# Patient Record
Sex: MALE | Race: WHITE | NOT HISPANIC OR LATINO | Employment: OTHER | URBAN - METROPOLITAN AREA
[De-identification: names, ages, dates, MRNs, and addresses within clinical notes are randomized per-mention and may not be internally consistent; named-entity substitution may affect disease eponyms.]

---

## 2017-05-19 DIAGNOSIS — E11.9 DIABETES MELLITUS WITHOUT COMPLICATION: ICD-10-CM

## 2017-05-20 ENCOUNTER — OUTPATIENT CASE MANAGEMENT (OUTPATIENT)
Dept: ADMINISTRATIVE | Facility: OTHER | Age: 77
End: 2017-05-20

## 2017-05-20 NOTE — PROGRESS NOTES
For your Information:    Please note the following patient has been assigned to Emiliana Mcknight RN with [facility] for screening.    Reason: Diabetes Disease Management   hbA1C>10    Please contact [facility] at ext [phone] with questions.    Thank you,  Cira Medrano, SSC

## 2017-05-22 ENCOUNTER — OUTPATIENT CASE MANAGEMENT (OUTPATIENT)
Dept: ADMINISTRATIVE | Facility: OTHER | Age: 77
End: 2017-05-22

## 2017-05-22 RX ORDER — BENAZEPRIL HYDROCHLORIDE 20 MG/1
20 TABLET ORAL DAILY
Qty: 90 TABLET | Refills: 0 | Status: SHIPPED | OUTPATIENT
Start: 2017-05-22 | End: 2017-06-12

## 2017-05-22 RX ORDER — METFORMIN HYDROCHLORIDE 500 MG/1
500 TABLET, EXTENDED RELEASE ORAL DAILY
Qty: 90 TABLET | Refills: 0 | Status: SHIPPED | OUTPATIENT
Start: 2017-05-22 | End: 2017-06-12

## 2017-05-22 NOTE — LETTER
May 22, 2017    Gumaro Broussard  [address] [place] [address]             [facility]  [address] [place] [address] Dear Jimmy Broussard:    It was a please talking with you today. I have enclosed the education materials we discussed.       If you have any questions or concerns, please don't hesitate to call.  I can be reached at [phone].    Sincerely,        Emiliana Mcknight RN

## 2017-05-22 NOTE — PROGRESS NOTES
"Chart reviewed. Contacted pt to complete initial assessment. Pt states he has been staying with his sister in [place] most of the time. The address is [address] [place] [address]. Discussed pt's last A1c of 11.1 on 2/11/17. Pt states he is not familiar with the A1C. Pt states he does check his blood sugar. When he went to turn the meter on the get the last result, he says the batteries are dead. He will change the batteries today. Pt states he is "not good" with taking his medications. The only medication he has with him is Metformin. Pt would like to change his pharmacy to [facility] in [place]. Pt is not taking Aspirin. He states he will purchase from the pharmacy today. Will send message to PCP requesting refills. Pt states he was not aware of HRA appt on Friday. Information provided. He states he will be there. Will mail education materials and contact information. Will follow up in 1 week.   "

## 2017-05-22 NOTE — PATIENT INSTRUCTIONS
Using a Blood Sugar Log    You have diabetes. This means your body has trouble regulating a sugar called glucose. To help manage your diabetes, youll need to check your blood sugar level as directed by your healthcare provider. Keeping a log of your blood sugar levels will help you track your blood sugar readings. Its a simple and easy way to see how well you are controlling your diabetes.  Checking your blood sugar level  You can check your blood sugar level with a blood glucose meter. Youll first prick the side of your finger with a tiny lancet to draw a tiny drop of blood onto the test strip. Some glucose meters let you use another place on your body to test. But these other places should not be used in some cases as they may be inaccurate. Follow the instructions for your glucose meter. And talk with your healthcare provider before doing the test on other places.  The strip goes into the meter first, then a drop of blood is placed on the tip of the strip. The meter then shows a reading that tells you the level of your blood sugar. Your readings should be in your target range as often as possible. This means not too high or too low. Staying in this range helps lower your risk for complications. Your healthcare provider will help you figure out the target range that is best for you.  Tracking your readings  Every time you check your blood sugar, use your log to keep track of your readings. Your meter will also probably have a memory feature that your healthcare provider can check at your next visit. You may be advised by your healthcare provider to check your blood sugar in the morning, at bedtime, and before and after meals. Be sure to write down all of your numbers. Also use your log to record things that might have affected your blood sugar. Some examples include being sick, certain medicines, being physically active, feeling stressed, or skipping meals.   Lessons learned from your readings  Tracking your  blood sugar readings helps you see patterns. These patterns tell you how your actions affect your blood sugar. For instance, you may have higher numbers after eating certain foods or lower numbers after exercise. They just help you understand how to stay in your target range more often, so that your diabetes remains in good control.  Sharing your log with your healthcare team  Bring your blood sugar log and glucose meter with you to all of your healthcare appointments. This can help your healthcare team make changes to your treatment plan, if needed. This may involve making changes in what you eat, what medicines you take, or how much you exercise.  To learn more  The resources below can help you learn more:  · American Diabetes Association 798-920-8739 www.diabetes.org  · [employer] 385-215-0776 www.lighthouse.org  · National [employer] [place] 900-217-0264 www.nei.nih.gov  · Hormone [employer] 692-994-7966 www.hormone.org  Date Last Reviewed: 5/1/2016  © 7540-6315 [employer]. [address] [place] [address]. All rights reserved. This information is not intended as a substitute for professional medical care. Always follow your healthcare professional's instructions.        Hypoglycemia (Low Blood Sugar)     Fast-acting sugar includes a cup of nonfat milk.     Too little sugar (glucose) in your blood is called hypoglycemia or low blood sugar. Low blood sugar usually means anything lower than 70 mg/dL. Talk with your healthcare provider about your target range and what level is too low for you. Diabetes itself doesnt cause low blood sugar. But some of the treatments for diabetes, such as pills or insulin, may raise your risk for it. Low blood sugar may cause you to pass out or have a seizure. So always treat low blood sugar right away, but don't overeat.  Special note: Always carry a source of fast-acting sugar and a snack in case of hypoglycemia.   What you may notice  If you  have low blood sugar, you may have one or more of these symptoms:  · Shakiness or dizziness  · Cold, clammy skin or sweating  · Feelings of hunger  · Headache  · Nervousness  · A hard, fast heartbeat  · Weakness  · Confusion or irritability  · Blurred vision  · Having nightmares or waking up confused or sweating  · Numbness or tingling in the lips or tongue  What you should do  Here are tips to follow if you have hypoglycemia:   · First check your blood sugar. If it is too low (out of your target range), eat or drink 15 to 20 grams of fast-acting sugar. This may be 3 to 4 glucose tablets, 4 ounces (half a cup) of fruit juice or regular (nondiet) soda, 8 ounces (1 cup) of fat-free milk, or 1 tablespoon of honey. Dont take more than this, or your blood sugar may go too high.  · Wait 15 minutes. Then recheck your blood sugar if you can.  · If your blood sugar is still too low, repeat the steps above and check your blood sugar again. If your blood sugar still has not returned to your target range, contact your healthcare provider or seek emergency care.  · Once your blood sugar returns to target range, eat a snack or meal.  Preventing low blood sugar  Things you can do include the following:   · If your condition needs a strict treatment plan, eat your meals and snacks at the same times each day. Dont skip meals!  · If your treatment plan lets you change when you eat and what you eat, learn how to change the time and dose of your rapid-acting insulin to match this.   · Ask your healthcare provider if it is safe for you to drink alcohol. Never drink on an empty stomach.  · Take your medicine at the prescribed times.  · Always carry a source of fast-acting sugar and a snack when youre away from home.  Other things to do  Additional tips include the following:  · Carry a medical ID card, a compact USB drive, or wear a medical alert bracelet or necklace. It should say that you have diabetes. It should also say what to do  if you pass out or have a seizure.  · Make sure your family, friends, and coworkers know the signs of low blood sugar. Tell them what to do if your blood sugar falls very low and you cant treat yourself.  · Keep a glucagon emergency kit handy. Be sure your family, friends, and coworkers know how and when to use it. Check it regularly and replace the glucagon before it expires.  · Talk with your health care team about other things you can do to prevent low blood sugar.     If you have unexplained hypoglycemia or hypoglycemia several times, call your healthcare provider.   Date Last Reviewed: 5/1/2016 © 2000-2016 [employer]. [address] [place] [address], [place] [address]. All rights reserved. This information is not intended as a substitute for professional medical care. Always follow your healthcare professional's instructions.        Healthy Meals for Diabetes     A healthcare provider will help you develop a meal plan that fits your needs.   Ask your healthcare team to help you make a meal plan that fits your needs. Your meal plan tells you when to eat your meals and snacks, what kinds of foods to eat, and how much of each food to eat. You dont have to give up all the foods you like. But you do need to follow some guidelines.  Choose healthy carbohydrates  Starches, sugars, and fiber are all types of carbohydrates. Fiber can help lower your cholesterol and triglycerides. Fiber is also healthy for your heart. You should have 20 to 35 grams of total fiber each day. Fiber-rich foods include:  · Whole-grain breads and cereals  · Bulgur wheat  · Brown rice     · Whole-wheat pasta  · Fruits and vegetables  · Dry beans, and peas   Keep track of the amount of carbohydrates you eat. This can help you keep the right balance of physical activity and medicine. The amount of carbohydrates needed will vary for each person. It depends on many things such as your health, the medicines you take, and how active you are.  Your healthcare team will help you figure out the right amount of carbohydrates for you. You may start with around 45 to 60 grams of carbohydrates per meal, depending on your situation.   Here are some examples of foods containing about 15 grams of carbohydrates (1 serving of carbohydrates):  · 1/2 cup of canned or frozen fruit  · A small piece of fresh fruit (4 ounces)  · 1 slice of bread  · 1/2 cup of oatmeal  · 1/3 cup of rice  · 4 to 6 crackers  · 1/2 English muffin  · 1/2 cup of black beans  · 1/4 of a large baked potato (3 ounces)  · 2/3 cup of plain fat-free yogurt  · 1 cup of soup  · 1/2 cup of casserole  · 6 chicken nuggets  · 2-inch-square brownie or cake without frosting  · 2 small cookies  · 1/2 cup of ice cream or sherbet  Choose healthy protein foods  Eating protein that is low in fat can help you control your weight. It also helps keep your heart healthy. Low-fat protein foods include:  · Fish  · Plant proteins, such as dry beans and peas, nuts, and soy products like tofu and soymilk  · Lean meat with all visible fat removed  · Poultry with the skin removed  · Low-fat or nonfat milk, cheese, and yogurt  Limit unhealthy fats and sugar  Saturated and trans fats are unhealthy for your heart. They raise LDL (bad) cholesterol. Fat is also high in calories, so it can make you gain weight. To cut down on unhealthy fats and sugar, limit these foods:  · Butter or margarine  · Palm and palm kernel oils and coconut oil  · Cream  · Cheese  · [place]co  · Lunch meats     · Ice cream  · Sweet bakery goods such as pies, muffins, and donuts  · Jams and jellies  · Candy bars  · Regular sodas   How much to eat  The amount of food you eat affects your blood sugar. It also affects your weight. Your healthcare team will tell you how much of each type of food you should eat.  · Use measuring cups and spoons and a food scale to measure serving sizes.  · Learn what a correct serving size looks like on your plate. This will help  when you are away from home and cant measure your servings.  · Eat only the number of servings given on your meal plan for each food. Dont take seconds.  · Learn to read food labels. Be sure to look at serving size, total carbohydrates, fiber, calories, sugar, and saturated and trans fats. Look for healthier alternatives to foods that have added sugar.  · Plan ahead for parties so you can still have a good time without going overboard with unhealthy food choices. Set a good example yourself by bringing a healthy dish to pot lucks.   Choose healthy snacks  When it comes to snacks, we usually think about foods with added sugar and fats. But there are many other options for healthier snack choices. Here are a few snack ideas to choose from:  Snacks with less than 5 grams of carbohydrates  · 1 piece of string cheese  · 3 celery sticks plus 1 tablespoon of peanut butter  · 5 cherry tomatoes plus 1 tablespoon of ranch dressing  · 1 hard-boiled egg  · 1/4 cup of fresh blueberries  ·  5 baby carrots  · 1 cup of light popcorn  · 1/2 cup of sugar-free gelatin  · 15 almonds  Snacks with about 10 to 20 grams of carbohydrates  · 1/3 cup of hummus plus 1 cup of fresh cut nonstarchy vegetables (carrots, green peppers, broccoli, celery, or a combination)  · 1/2 cup of fresh or canned fruit plus 1/4 cup of cottage cheese  · 1/2 cup of tuna salad with 4 crackers  · 2 rice cakes and a tablespoon of peanut butter  · 1 small apple or orange  · 3 cups light popcorn  · 1/2 of a turkey sandwich (1 slice of whole-wheat bread, 2 ounces of turkey, and mustard)  Portion sizes are important to controlling your blood sugar and staying at a healthy weight. Stock up on healthy snack items so you always have them on hand.  When to eat  Your meal plan will likely include breakfast, lunch, dinner, and some snacks.  · Try to eat your meals and snacks at about the same times each day.  · Eat all your meals and snacks. Skipping a meal or snack can  make your blood sugar drop too low. It can also cause you to eat too much at the next meal or snack. Then your blood sugar could get too high.  Date Last Reviewed: 7/1/2016  © 9451-4149 [employer]. [address] [place] [address], [place] [address]. All rights reserved. This information is not intended as a substitute for professional medical care. Always follow your healthcare professional's instructions.        High Blood Sugar (Hyperglycemia)     When you have hyperglycemia, drink plenty of water or other sugar-free, caffeine-free liquids.   Too much glucose (sugar) in your blood is called hyperglycemia or high blood sugar. High blood sugar can lead to a dangerous condition called ketoacidosis. In severe cases, it can lead to dehydration and coma.  Possible causes of hyperglycemia  · Inadequate treatment plan for diabetes   · Being sick  · Being under stress  · Taking certain medicines, such as steroids  · Eating too much food, especially carbohydrates  · Being less active than usual  · Not taking enough diabetes medicine  Symptoms of hyperglycemia  Hyperglycemia may not cause symptoms. If you do have symptoms, they may include:  · Thirst  · Frequent need to urinate  · Feeling tired  · Nausea and vomiting  · Itchy, dry skin  · Blurry vision  · Fast breathing and breath that smells fruity   · Weakness  · Dizziness  · Wounds or skin infections that dont heal  · Unexplained weight loss if hyperglycemia lasts for more than a few days   What you should do  Make sure you do the following:  · Check your blood sugar.  · Drink plenty of sugar-free, caffeine-free liquids such as water. Dont drink fruit juice.  · Check your blood sugar again every 4 hours. If you take insulin or diabetes medicines, follow your sick-day plan for taking medicine. Call your healthcare provider if you are not able to eat.  · Check your blood or urine for ketones as directed.  · Call your healthcare provider if your blood sugar and  ketones do not return to your target range.  Preventing high blood sugar  To help keep your blood sugar from getting too high:  · Control stress.  · When you're ill, follow your sick-day plan.   · Follow your meal plan. Eat only the amount of food on your meal plan  · Follow your exercise plan.  · Take your insulin or diabetes medicines as directed by your healthcare team. Also test your blood sugar as directed. If the plan is not working for you, discuss it with your healthcare provider.  Other things to do  · Carry a medical ID card, a compact USB drive, or wear a medical alert bracelet or necklace. It should say that you have diabetes. It should also say what to do in case you pass out or go into a coma.  · Make sure family, friends, and coworkers know the signs of high blood sugar. Tell them what to do if your blood sugar gets very high and you cant help yourself.  · Talk to your healthcare team about other things you can do to prevent high blood sugar.   Special note: Drink plenty of sugar-free and caffeine-free liquids when you feel symptoms of hyperglycemia. Call your healthcare provider if you keep having episodes of hyperglycemia.   Date Last Reviewed: 5/1/2016  © 2075-1309 [employer] [address] [place] [address], [place] [address]. All rights reserved. This information is not intended as a substitute for professional medical care. Always follow your healthcare professional's instructions.

## 2017-05-23 ENCOUNTER — OUTPATIENT CASE MANAGEMENT (OUTPATIENT)
Dept: ADMINISTRATIVE | Facility: OTHER | Age: 77
End: 2017-05-23

## 2017-05-31 ENCOUNTER — OUTPATIENT CASE MANAGEMENT (OUTPATIENT)
Dept: ADMINISTRATIVE | Facility: OTHER | Age: 77
End: 2017-05-31

## 2017-06-06 ENCOUNTER — OUTPATIENT CASE MANAGEMENT (OUTPATIENT)
Dept: ADMINISTRATIVE | Facility: OTHER | Age: 77
End: 2017-06-06

## 2017-06-06 NOTE — PROGRESS NOTES
"Chart reviewed. Noted pt missed HRA appt. Telephonic follow up with pt. Pt states he forgot about the appt. And will call to reschedule. Reviewed upcoming appts with pt. Will send appt reminders via mail. Pt states he's been staying in [place]. He thinks he will probably choose a PCP on the [facility]. Pt will discuss with his sister. Pt states he changed the batteries in his glucometer but has not been monitoring. Pt states "I guess I should do that today". Pt agrees to monitor and log his blood sugars for 2 weeks. Pt states he is unsure if he received the education materials in the mail. He will check with his sister. Will follow up in 2 weeks.   "

## 2017-06-12 ENCOUNTER — OFFICE VISIT (OUTPATIENT)
Dept: OTOLARYNGOLOGY | Facility: CLINIC | Age: 77
End: 2017-06-12
Payer: MEDICARE

## 2017-06-12 ENCOUNTER — CLINICAL SUPPORT (OUTPATIENT)
Dept: AUDIOLOGY | Facility: CLINIC | Age: 77
End: 2017-06-12
Payer: MEDICARE

## 2017-06-12 VITALS
SYSTOLIC BLOOD PRESSURE: 139 MMHG | HEIGHT: 66 IN | DIASTOLIC BLOOD PRESSURE: 87 MMHG | BODY MASS INDEX: 29.73 KG/M2 | HEART RATE: 81 BPM | WEIGHT: 185 LBS

## 2017-06-12 DIAGNOSIS — H61.23 BILATERAL IMPACTED CERUMEN: Primary | ICD-10-CM

## 2017-06-12 DIAGNOSIS — H90.3 BILATERAL SENSORINEURAL HEARING LOSS: ICD-10-CM

## 2017-06-12 DIAGNOSIS — H90.3 BILATERAL HIGH FREQUENCY SENSORINEURAL HEARING LOSS: Primary | ICD-10-CM

## 2017-06-12 DIAGNOSIS — H91.93 HEARING DIFFICULTY, BILATERAL: Primary | ICD-10-CM

## 2017-06-12 PROCEDURE — 92567 TYMPANOMETRY: CPT | Mod: S$GLB,,, | Performed by: AUDIOLOGIST-HEARING AID FITTER

## 2017-06-12 PROCEDURE — 99999 PR PBB SHADOW E&M-EST. PATIENT-LVL III: CPT | Mod: PBBFAC,,, | Performed by: NURSE PRACTITIONER

## 2017-06-12 PROCEDURE — 92557 COMPREHENSIVE HEARING TEST: CPT | Mod: S$GLB,,, | Performed by: AUDIOLOGIST-HEARING AID FITTER

## 2017-06-12 PROCEDURE — G0268 REMOVAL OF IMPACTED WAX MD: HCPCS | Mod: S$GLB,,, | Performed by: NURSE PRACTITIONER

## 2017-06-12 PROCEDURE — 99203 OFFICE O/P NEW LOW 30 MIN: CPT | Mod: 25,S$GLB,, | Performed by: NURSE PRACTITIONER

## 2017-06-12 PROCEDURE — 1159F MED LIST DOCD IN RCRD: CPT | Mod: S$GLB,,, | Performed by: NURSE PRACTITIONER

## 2017-06-12 PROCEDURE — 1126F AMNT PAIN NOTED NONE PRSNT: CPT | Mod: S$GLB,,, | Performed by: NURSE PRACTITIONER

## 2017-06-12 RX ORDER — DONEPEZIL HYDROCHLORIDE 10 MG/1
10 TABLET, FILM COATED ORAL NIGHTLY
COMMUNITY
End: 2018-02-19

## 2017-06-12 RX ORDER — SITAGLIPTIN AND METFORMIN HYDROCHLORIDE 1000; 50 MG/1; MG/1
TABLET, FILM COATED, EXTENDED RELEASE ORAL
COMMUNITY
Start: 2017-06-07

## 2017-06-12 RX ORDER — LISINOPRIL 2.5 MG/1
TABLET ORAL
COMMUNITY
Start: 2017-06-06

## 2017-06-12 NOTE — PROGRESS NOTES
Gumaro Broussard was seen 06/12/2017 for an audiological evaluation. Patient complains of hearing loss. Pt reports decreased hearing AS. Pt had significant impacted ear wax AS that was removed by Coty Hodges, ENT NP. Pt and his son report no communication difficulties with regard to his hearing loss. He feels that he hears fine.     Results reveal a mild-to-severe sensorineural hearing loss for the right ear, and  mild-to-profound sensorineural hearing loss for the left ear.    Speech Reception Thresholds were  30 dBHL for the right ear and 30 dBHL for the left ear.    Word recognition scores were good for the right ear and good for the left ear.   Tympanograms were Type A for the right ear and Type A for the left ear.    Audiogram results were reviewed in detail with patient and all questions were answered. Results will be reviewed by ENT at the completion of this note. Recommend binaural amplification pending medical clearance, annual hearing tests to monitor hearing loss and hearing protection in loud noise. Pt said he will take notice of any problems hearing and call the clinic if he has any questions.

## 2017-06-12 NOTE — PROGRESS NOTES
Subjective:       Patient ID: Gumaro Broussard is a 77 y.o. male.    Chief Complaint: Cerumen Impaction    HPI   Patient last had his ears cleaned out in January by Dr. Anderson. He is here for audiogram; however audiologist was unable to perform any audiometric testing due to bilateral cerumen impactions today. He did not suspect cerumen impactions but rather decline in hearing. He denies otalgia, otorrhea, or other ENT symptom or concern at this time.     Review of Systems   Constitutional: Negative.    HENT: Positive for hearing loss.    Eyes: Negative.    Respiratory: Negative.    Cardiovascular: Negative.    Gastrointestinal: Negative.    Musculoskeletal: Negative.    Skin: Negative.    Neurological: Negative.    Hematological: Negative.    Psychiatric/Behavioral: Negative.        Objective:      Physical Exam   Constitutional: He is oriented to person, place, and time. Vital signs are normal. He appears well-developed and well-nourished. He is cooperative. He does not appear ill. No distress.   HENT:   Head: Normocephalic and atraumatic.   Right Ear: Hearing, tympanic membrane, external ear and ear canal normal. Tympanic membrane is not erythematous. No middle ear effusion.   Left Ear: Hearing, tympanic membrane, external ear and ear canal normal. Tympanic membrane is not erythematous.  No middle ear effusion.   Nose: Nose normal. No mucosal edema or rhinorrhea. Right sinus exhibits no maxillary sinus tenderness and no frontal sinus tenderness. Left sinus exhibits no maxillary sinus tenderness and no frontal sinus tenderness.   Mouth/Throat: Uvula is midline, oropharynx is clear and moist and mucous membranes are normal. Mucous membranes are not pale, not dry and not cyanotic. No oral lesions. No oropharyngeal exudate, posterior oropharyngeal edema or posterior oropharyngeal erythema.   Eyes: Conjunctivae, EOM and lids are normal. Pupils are equal, round, and reactive to light. Right eye exhibits no  discharge. Left eye exhibits no discharge. No scleral icterus.   Neck: Trachea normal and normal range of motion. Neck supple. No tracheal deviation present. No thyroid mass and no thyromegaly present.   Cardiovascular: Normal rate.    Pulmonary/Chest: Effort normal. No stridor. No respiratory distress. He has no wheezes.   Musculoskeletal: Normal range of motion.   Lymphadenopathy:        Head (right side): No submental, no submandibular, no tonsillar, no preauricular and no posterior auricular adenopathy present.        Head (left side): No submental, no submandibular, no tonsillar, no preauricular and no posterior auricular adenopathy present.     He has no cervical adenopathy.        Right cervical: No superficial cervical and no posterior cervical adenopathy present.       Left cervical: No superficial cervical and no posterior cervical adenopathy present.   Neurological: He is alert and oriented to person, place, and time. He has normal strength. Coordination and gait normal.   Skin: Skin is warm, dry and intact. No lesion and no rash noted. He is not diaphoretic. No cyanosis. No pallor.   Psychiatric: He has a normal mood and affect. His speech is normal and behavior is normal. Judgment and thought content normal. Cognition and memory are normal.   Nursing note and vitals reviewed.      SEPARATE PROCEDURE IN OFFICE:   Procedure: Removal of impacted cerumen, bilateral   Pre Procedure Diagnosis: Cerumen Impaction   Post Procedure Diagnosis: Cerumen Impaction   Verbal informed consent in regards to risk of trauma to ear canal, ear drum or hearing, discomfort during procedure and/or inability to remove cerumen impaction in one session or unforeseen events or complications.   No anesthesia.     Procedure in detail:   Ear canal visualized bilateral with appropriate size ear speculum utilizing Operating Head Binocular Otomicroscope   Utilizing the following: Ring curet, delicate alligator forceps, and/or suction  cannula. The impacted cerumen of the ear canals was removed atraumatically. The TM and EAC were then inspected and found to be clear of wax. See description of TMs/EACs in PE above.   Complications: No   Condition: Improved/Good    Assessment:     Cerumen impactions removed AU    Mild/moderate to severe/profound SNHL AU  Plan:     PATIENT IS MEDICALLY CLEARED FOR HEARING AIDS.   Today's audiogram reveals the patient is a candidate for amplification. Audiogram is reviewed in detail with the patient. The audiologist's recommendation that the patient have amplification/hearing aids is discussed and questions answered. Patient has been given information by the audiologist on how to schedule a hearing aid consultation. Patient is encouraged to wear ear protection in loud noise and return annually for hearing test. Return to clinic as needed for further ENT concerns.

## 2017-06-21 ENCOUNTER — OUTPATIENT CASE MANAGEMENT (OUTPATIENT)
Dept: ADMINISTRATIVE | Facility: OTHER | Age: 77
End: 2017-06-21

## 2017-06-21 NOTE — PROGRESS NOTES
Contacted pt for follow up. Pt states he has started monitoring his blood sugars and logging. He is at the grocery store and requests call back next week to review.    Plan:   Review blood sugar log  Follow up on PCP on the [facility]  Remind to reschedule appt for HRA  Find out if pt received education packet and review

## 2017-06-28 ENCOUNTER — OUTPATIENT CASE MANAGEMENT (OUTPATIENT)
Dept: ADMINISTRATIVE | Facility: OTHER | Age: 77
End: 2017-06-28

## 2017-06-28 NOTE — PROGRESS NOTES
Follow up with pt. Pt understands med and dosages. Pt remains forgetful at times. Encouraged pt to schedule appt with pcp. He states he will talk to his sister about choosing a PCP on the [facility]. He states he rarely goes to his house in [place]. Will follow up with pt in 2 weeks. Pt states he is not interested in diabetes education at this time.       Interventions performed:     Encourage pt to schedule PCP appt.   Encouraged compliance with diet and BG monitoring and logging    Plan: Continue diabetes education

## 2017-07-13 ENCOUNTER — OUTPATIENT CASE MANAGEMENT (OUTPATIENT)
Dept: ADMINISTRATIVE | Facility: OTHER | Age: 77
End: 2017-07-13

## 2017-07-13 NOTE — PROGRESS NOTES
Pt remains with his sister. He is still deciding on PCP on the [facility].     Interventions performed:     Further education on diabetes diet  REviewed AIC and hypo/hyperglycemia education       Plan:     Review education  Encourage compliance with treatment plan  Plan discharge

## 2017-08-04 ENCOUNTER — OUTPATIENT CASE MANAGEMENT (OUTPATIENT)
Dept: ADMINISTRATIVE | Facility: OTHER | Age: 77
End: 2017-08-04

## 2017-08-04 NOTE — PROGRESS NOTES
Spoke with pt's sister, Sherie. She states pt has moved in with her. She reports she has diabetes also and is trying to work with pt. She understands A1C, s/s of hyper and hypotension. She states he sometimes gets up during the night to eat. Pt has established care with Dr. Long on the [facility]. She states pt has a follow up appt next week. She states they misplaced the diabetes management guide and she is requesting the sample menus. Will mail out today.     Pt dis enrolled from [facility].

## 2017-08-15 ENCOUNTER — LAB VISIT (OUTPATIENT)
Dept: LAB | Facility: HOSPITAL | Age: 77
End: 2017-08-15
Attending: FAMILY MEDICINE
Payer: MEDICARE

## 2017-08-15 ENCOUNTER — OFFICE VISIT (OUTPATIENT)
Dept: DERMATOLOGY | Facility: CLINIC | Age: 77
End: 2017-08-15
Payer: MEDICARE

## 2017-08-15 VITALS — WEIGHT: 185 LBS | BODY MASS INDEX: 29.73 KG/M2 | HEIGHT: 66 IN

## 2017-08-15 DIAGNOSIS — Z12.83 SKIN CANCER SCREENING: ICD-10-CM

## 2017-08-15 DIAGNOSIS — D18.00 ANGIOMA: ICD-10-CM

## 2017-08-15 DIAGNOSIS — I10 ESSENTIAL HYPERTENSION, MALIGNANT: ICD-10-CM

## 2017-08-15 DIAGNOSIS — Z79.899 ENCOUNTER FOR LONG-TERM (CURRENT) USE OF OTHER MEDICATIONS: ICD-10-CM

## 2017-08-15 DIAGNOSIS — L82.0 INFLAMED SEBORRHEIC KERATOSIS: Primary | ICD-10-CM

## 2017-08-15 DIAGNOSIS — K21.9 ESOPHAGEAL REFLUX: ICD-10-CM

## 2017-08-15 DIAGNOSIS — L30.8 ASTEATOTIC ECZEMA: ICD-10-CM

## 2017-08-15 DIAGNOSIS — L29.9 SCALP PRURITUS: ICD-10-CM

## 2017-08-15 LAB
ANION GAP SERPL CALC-SCNC: 9 MMOL/L
BUN SERPL-MCNC: 18 MG/DL
CALCIUM SERPL-MCNC: 9.3 MG/DL
CHLORIDE SERPL-SCNC: 102 MMOL/L
CO2 SERPL-SCNC: 28 MMOL/L
CREAT SERPL-MCNC: 1.2 MG/DL
EST. GFR  (AFRICAN AMERICAN): >60 ML/MIN/1.73 M^2
EST. GFR  (NON AFRICAN AMERICAN): 58.4 ML/MIN/1.73 M^2
GLUCOSE SERPL-MCNC: 376 MG/DL
POTASSIUM SERPL-SCNC: 5 MMOL/L
SODIUM SERPL-SCNC: 139 MMOL/L

## 2017-08-15 PROCEDURE — 99999 PR PBB SHADOW E&M-EST. PATIENT-LVL II: CPT | Mod: PBBFAC,,, | Performed by: DERMATOLOGY

## 2017-08-15 PROCEDURE — 1159F MED LIST DOCD IN RCRD: CPT | Mod: S$GLB,,, | Performed by: DERMATOLOGY

## 2017-08-15 PROCEDURE — 1126F AMNT PAIN NOTED NONE PRSNT: CPT | Mod: S$GLB,,, | Performed by: DERMATOLOGY

## 2017-08-15 PROCEDURE — 3008F BODY MASS INDEX DOCD: CPT | Mod: S$GLB,,, | Performed by: DERMATOLOGY

## 2017-08-15 PROCEDURE — 99203 OFFICE O/P NEW LOW 30 MIN: CPT | Mod: S$GLB,,, | Performed by: DERMATOLOGY

## 2017-08-15 RX ORDER — CLOBETASOL PROPIONATE 0.46 MG/ML
SOLUTION TOPICAL
Qty: 60 ML | Refills: 3 | Status: SHIPPED | OUTPATIENT
Start: 2017-08-15

## 2017-08-15 RX ORDER — HYDROCORTISONE 25 MG/ML
LOTION TOPICAL
Qty: 59 ML | Refills: 2 | Status: SHIPPED | OUTPATIENT
Start: 2017-08-15

## 2017-08-15 RX ORDER — METFORMIN HYDROCHLORIDE 500 MG/1
500 TABLET ORAL 2 TIMES DAILY WITH MEALS
COMMUNITY

## 2017-08-15 RX ORDER — TRIAMCINOLONE ACETONIDE 1 MG/G
OINTMENT TOPICAL
Qty: 60 G | Refills: 1 | Status: SHIPPED | OUTPATIENT
Start: 2017-08-15

## 2017-08-15 NOTE — PROGRESS NOTES
Subjective:       Patient ID:  Gumaro Broussard is a 77 y.o. male who presents for   Chief Complaint   Patient presents with    Skin Check    Lesion     Pt here for initial visit skin check. Last seen by a dermatologist many years ago  Lesions on forearms for several months- rough- not treated  Lesions on left lower leg for several months- rough- used prescription cream in the past- little improvement   Itchy scalp- scratches often    Phx of SCC excised from scalp many years ago  Father hx  skin cancer       C/o chronic itching, scalp and back.     Review of Systems   Skin: Negative for daily sunscreen use, activity-related sunscreen use and recent sunburn.        Objective:    Physical Exam   Constitutional: He appears well-developed and well-nourished. No distress.   HENT:   Mouth/Throat: Lips normal.    Eyes: Lids are normal.  No conjunctival no injection.   Cardiovascular: There is no local extremity swelling and no dependent edema.     Neurological: He is alert and oriented to person, place, and time. He is not disoriented.   Psychiatric: He has a normal mood and affect.   Skin:   Areas Examined (abnormalities noted in diagram):   Scalp / Hair Palpated and Inspected  Head / Face Inspection Performed  Neck Inspection Performed  Chest / Axilla Inspection Performed  Abdomen Inspection Performed  Back Inspection Performed  RUE Inspected  LUE Inspection Performed  RLE Inspected  LLE Inspection Performed                   Diagram Legend     Erythematous scaling macule/papule c/w actinic keratosis       Vascular papule c/w angioma      Pigmented verrucoid papule/plaque c/w seborrheic keratosis      Yellow umbilicated papule c/w sebaceous hyperplasia      Irregularly shaped tan macule c/w lentigo     1-2 mm smooth white papules consistent with Milia      Movable subcutaneous cyst with punctum c/w epidermal inclusion cyst      Subcutaneous movable cyst c/w pilar cyst      Firm pink to brown papule c/w  dermatofibroma      Pedunculated fleshy papule(s) c/w skin tag(s)      Evenly pigmented macule c/w junctional nevus     Mildly variegated pigmented, slightly irregular-bordered macule c/w mildly atypical nevus      Flesh colored to evenly pigmented papule c/w intradermal nevus       Pink pearly papule/plaque c/w basal cell carcinoma      Erythematous hyperkeratotic cursted plaque c/w SCC      Surgical scar with no sign of skin cancer recurrence      Open and closed comedones      Inflammatory papules and pustules      Verrucoid papule consistent consistent with wart     Erythematous eczematous patches and plaques     Dystrophic onycholytic nail with subungual debris c/w onychomycosis     Umbilicated papule    Erythematous-base heme-crusted tan verrucoid plaque consistent with inflamed seborrheic keratosis     Erythematous Silvery Scaling Plaque c/w Psoriasis     See annotation      Assessment / Plan:        Inflamed seborrheic keratosis, right chest  Cryosurgery procedure note:    Verbal consent from the patient is obtained. Liquid nitrogen cryosurgery is applied to 2 lesions to produce a freeze injury. The patient is aware that blisters may form and is instructed on wound care with gentle cleansing and use of vaseline ointment to keep moist until healed. The patient is supplied a handout on cryosurgery and is instructed to call if lesions do not completely resolve. Risk of dyspigmentation discussed.       Skin cancer screening  Area(s) of previous NMSC evaluated with no signs of recurrence.    Upper body skin examination performed today including at least 6 points as noted in physical examination. No lesions suspicious for malignancy noted.      Asteatotic eczema  Discussed the following dry skin tips:    Avoid hot baths and showers, keep showers or baths brief (10-15 min), use a mild soap or cleanser, pat skin dry after showering and apply a moisturizer within 3 minutes of getting out of bath (cetaphil cream,  ceraVe, aquaphor) and reapply moisturizers 2-4 times/day.       Angioma, trunk  This is a benign vascular lesion. Reassurance given. No treatment required.       Scalp pruritus  HC lotion bid prn            Return in about 6 weeks (around 9/26/2017).

## 2017-08-15 NOTE — PROGRESS NOTES
Subjective:       Patient ID:  Gumaro Broussard is a 77 y.o. male who presents for No chief complaint on file.    Pt here for initial visit         Review of Systems     Objective:    Physical Exam       Diagram Legend     Erythematous scaling macule/papule c/w actinic keratosis       Vascular papule c/w angioma      Pigmented verrucoid papule/plaque c/w seborrheic keratosis      Yellow umbilicated papule c/w sebaceous hyperplasia      Irregularly shaped tan macule c/w lentigo     1-2 mm smooth white papules consistent with Milia      Movable subcutaneous cyst with punctum c/w epidermal inclusion cyst      Subcutaneous movable cyst c/w pilar cyst      Firm pink to brown papule c/w dermatofibroma      Pedunculated fleshy papule(s) c/w skin tag(s)      Evenly pigmented macule c/w junctional nevus     Mildly variegated pigmented, slightly irregular-bordered macule c/w mildly atypical nevus      Flesh colored to evenly pigmented papule c/w intradermal nevus       Pink pearly papule/plaque c/w basal cell carcinoma      Erythematous hyperkeratotic cursted plaque c/w SCC      Surgical scar with no sign of skin cancer recurrence      Open and closed comedones      Inflammatory papules and pustules      Verrucoid papule consistent consistent with wart     Erythematous eczematous patches and plaques     Dystrophic onycholytic nail with subungual debris c/w onychomycosis     Umbilicated papule    Erythematous-base heme-crusted tan verrucoid plaque consistent with inflamed seborrheic keratosis     Erythematous Silvery Scaling Plaque c/w Psoriasis     See annotation      Assessment / Plan:        There are no diagnoses linked to this encounter.         No Follow-up on file.

## 2017-08-16 LAB
ESTIMATED AVG GLUCOSE: 286 MG/DL
HBA1C MFR BLD HPLC: 11.6 %

## 2017-08-17 ENCOUNTER — OFFICE VISIT (OUTPATIENT)
Dept: PODIATRY | Facility: CLINIC | Age: 77
End: 2017-08-17
Payer: MEDICARE

## 2017-08-17 VITALS — HEIGHT: 66 IN | WEIGHT: 179.69 LBS | BODY MASS INDEX: 28.88 KG/M2

## 2017-08-17 DIAGNOSIS — E11.42 DIABETIC POLYNEUROPATHY ASSOCIATED WITH TYPE 2 DIABETES MELLITUS: Primary | ICD-10-CM

## 2017-08-17 DIAGNOSIS — M20.41 HAMMER TOES, BILATERAL: ICD-10-CM

## 2017-08-17 DIAGNOSIS — M20.42 HAMMER TOES, BILATERAL: ICD-10-CM

## 2017-08-17 DIAGNOSIS — B35.1 ONYCHOMYCOSIS: ICD-10-CM

## 2017-08-17 PROCEDURE — 99499 UNLISTED E&M SERVICE: CPT | Mod: S$GLB,,, | Performed by: PODIATRIST

## 2017-08-17 PROCEDURE — 99999 PR PBB SHADOW E&M-EST. PATIENT-LVL III: CPT | Mod: PBBFAC,,, | Performed by: PODIATRIST

## 2017-08-17 PROCEDURE — 11721 DEBRIDE NAIL 6 OR MORE: CPT | Mod: Q9,S$GLB,, | Performed by: PODIATRIST

## 2017-08-17 RX ORDER — SITAGLIPTIN 100 MG/1
TABLET, FILM COATED ORAL
Refills: 5 | COMMUNITY
Start: 2017-08-13

## 2017-08-18 NOTE — PROGRESS NOTES
Subjective:      Patient ID: Gumaro Broussard is a 77 y.o. male.    Chief Complaint: Foot Pain and Diabetic Foot Exam (8/17 - [place]  A1C 8/15/17 11.6)    Gumaro is a 77 y.o. male who presents to the clinic for evaluation and treatment of diabetic feet. Gumaro has a past medical history of Diabetes mellitus type II; External hemorrhoids; GERD (gastroesophageal reflux disease); and Hypertension. Patient relates no major problem with feet. Only complaints today consist of toenails in need of trimming.  Denies being painful with wearing shoe gear.  Has not attempted to self treat.  Also, relates occasional shooting pain in the Rt. Foot that rapidly dissipates.  States these symptoms are more prevalent at night. Denies any additional pedal complaints.    PCP: Dr. Alfaro  Date Last Seen by PCP: 9/16/16  Hemoglobin A1C   Date Value Ref Range Status   08/15/2017 11.6 (H) 4.0 - 5.6 % Final     Comment:     According to ADA guidelines, hemoglobin A1c <7.0% represents  optimal control in non-pregnant diabetic patients. Different  metrics may apply to specific patient populations.   Standards of Medical Care in Diabetes-2016.  For the purpose of screening for the presence of diabetes:  <5.7%     Consistent with the absence of diabetes  5.7-6.4%  Consistent with increasing risk for diabetes   (prediabetes)  >or=6.5%  Consistent with diabetes  Currently, no consensus exists for use of hemoglobin A1c  for diagnosis of diabetes for children.  This Hemoglobin A1c assay has significant interference with fetal   hemoglobin   (HbF). The results are invalid for patients with abnormal amounts of   HbF,   including those with known Hereditary Persistence   of Fetal Hemoglobin. Heterozygous hemoglobin variants (HbAS, HbAC,   HbAD, HbAE, HbA2) do not significantly interfere with this assay;   however, presence of multiple variants in a sample may impact the %   interference.     02/11/2017 11.1 (H) 0.0 - 5.6 % Final      Comment:     Reference Interval:  5.0 - 5.6 Normal   5.7 - 6.4 High Risk   > 6.5 Diabetic    Hgb A1c results are standardized based on the (NGSP) National   Glycohemoglobin Standardization Program.    Hemoglobin A1C levels are related to mean serum/plasma glucose   during the preceding 2-3 months.        09/12/2016 12.9 (H) 4.5 - 6.2 % Final     Comment:     According to ADA guidelines, hemoglobin A1C <7.0% represents  optimal control in non-pregnant diabetic patients.  Different  metrics may apply to specific populations.   Standards of Medical Care in Diabetes - 2016.  For the purpose of screening for the presence of diabetes:  <5.7%     Consistent with the absence of diabetes  5.7-6.4%  Consistent with increasing risk for diabetes   (prediabetes)  >or=6.5%  Consistent with diabetes  Currently no consensus exists for use of hemoglobin A1C  for diagnosis of diabetes for children.             Past Medical History:   Diagnosis Date    Diabetes mellitus type II     External hemorrhoids     GERD (gastroesophageal reflux disease)     Hypertension        Past Surgical History:   Procedure Laterality Date    ADENOIDECTOMY      CARPAL TUNNEL RELEASE Right     COLONOSCOPY      COLONOSCOPY N/A 11/9/2015    Procedure: COLONOSCOPY;  Surgeon: Brigido Acevedo MD;  Location: [address];  Service: Endoscopy;  Laterality: N/A;  f/u 3 yrs     KNEE ARTHROSCOPY Right     TONSILLECTOMY      VASECTOMY         Family History   Problem Relation Age of Onset    Diabetes Mother     Cancer Mother      Liver    Liver cancer Mother     Heart disease Father     COPD Father     No Known Problems Sister     No Known Problems Son     Prostate cancer Neg Hx     Kidney disease Neg Hx        Social History     Social History    Marital status:      Spouse name: N/A    Number of children: N/A    Years of education: N/A     Social History Main Topics    Smoking status: Former Smoker     Packs/day: 0.50      Years: 2.00     Quit date: 1/1/1964    Smokeless tobacco: Never Used    Alcohol use Yes      Comment: occacional wine or beer    Drug use: No    Sexual activity: Not Currently     Partners: Female     Other Topics Concern    None     Social History Narrative    None       Current Outpatient Prescriptions   Medication Sig Dispense Refill    aspirin 81 MG Chew Take 81 mg by mouth once daily.      clobetasol (TEMOVATE) 0.05 % external solution Mix into jar of CeraVe cream and trunk and extremities bid prn 60 mL 3    donepezil (ARICEPT) 10 MG tablet Take 10 mg by mouth every evening.      hydrocortisone 2.5 % lotion aaa scalp bid prn itching 59 mL 2    JANUMET XR 50-1,000 mg TM24       JANUVIA 100 mg Tab TK 1 T PO QD  5    lisinopril (PRINIVIL,ZESTRIL) 2.5 MG tablet       metformin (GLUCOPHAGE) 500 MG tablet Take 500 mg by mouth 2 (two) times daily with meals.      MULTIVIT-MIN/FA/LYCOPEN/LUTEIN (CENTRUM SILVER ULTRA MEN'S ORAL) Take 1 tablet by mouth once daily.      pantoprazole (PROTONIX) 40 MG tablet TAKE ONE TABLET BY MOUTH ONCE DAILY BEFORE BREAKFAST 90 tablet 0    triamcinolone acetonide 0.1% (KENALOG) 0.1 % ointment AAA back bid x 1-2 weeks then prn, avoid chronic use 60 g 1     No current facility-administered medications for this visit.        Review of patient's allergies indicates:  No Known Allergies      Review of Systems   Constitution: Negative for chills and fever.   Cardiovascular: Negative for claudication and leg swelling.   Skin: Positive for color change, dry skin and nail changes.   Musculoskeletal: Negative for joint pain and joint swelling.   Neurological: Positive for paresthesias. Negative for numbness.   Psychiatric/Behavioral: Negative for altered mental status.           Objective:      Physical Exam   Constitutional: He is oriented to person, place, and time. He appears well-developed and well-nourished. No distress.   Cardiovascular:   Pulses:       Dorsalis pedis pulses  are 2+ on the right side, and 2+ on the left side.        Posterior tibial pulses are 1+ on the right side, and 1+ on the left side.   CFT <3 seconds bilateral.  Pedal hair growth decreased bilateral.  Varicosities noted bilateral.  Mild nonpitting edema noted bilateral.  Toes are cool to touch bilateral.     Musculoskeletal: He exhibits no edema or tenderness.   Muscle strength 5/5 in all muscle groups bilateral.  No tenderness nor crepitation with ROM of foot/ankle joints bilateral.  No tenderness with palpation of bilateral foot and ankle.  Bilateral pes cavus foot type.  Bilateral hallux abducto valgus.  Bilateral tailor's bunion.  Bilateral semi-rigid contracture of toes 2-5.   Neurological: He is alert and oriented to person, place, and time. He has normal strength. No sensory deficit.   Protective sensation per [place] monofilament intact bilateral.    Vibratory sensation intact bilateral.    Light touch intact bilateral.   Skin: Skin is warm and dry. Capillary refill takes less than 2 seconds. No rash noted. He is not diaphoretic. No erythema. No pallor.   Pedal skin appears thin and atrophic bilateral.  Toenails x 10 appear thickened by 2 mm's, elongated by 5 mm's, and discolored with subungual debris. Examination of the skin reveals no evidence of significant maceration, rashes, open lesions, suspicious appearing nevi or other concerning lesions.              Assessment:       Encounter Diagnoses   Name Primary?    Onychomycosis     Hammer toes, bilateral     Diabetic polyneuropathy associated with type 2 diabetes mellitus Yes         Plan:       Gumaro was seen today for foot pain and diabetic foot exam.    Diagnoses and all orders for this visit:    Diabetic polyneuropathy associated with type 2 diabetes mellitus    Onychomycosis    Hammer toes, bilateral      I counseled the patient on his conditions, their implications and medical management.    Advised to wear shoes that accommodate for  digital deformities.    Shoe inspection. Diabetic Foot Education. Patient reminded of the importance of good nutrition and blood sugar control to help prevent podiatric complications of diabetes. Patient instructed on proper foot hygeine. We discussed wearing proper shoe gear, daily foot inspections, never walking without protective shoe gear, never putting sharp instruments to feet    With patient's permission, nails were aggressively reduced and debrided x 10 to their soft tissue attachment mechanically and with electric , removing all offending nail and debris. Patient relates relief following the procedure. He will continue to monitor the areas daily, inspect his feet, wear protective shoe gear when ambulatory, moisturizer to maintain skin integrity and follow in this office in approximately 6 months, sooner p.r.n.    Return in about 6 months (around 2/17/2018).    Charles Avelar DPM

## 2017-08-29 ENCOUNTER — TELEPHONE (OUTPATIENT)
Dept: PODIATRY | Facility: CLINIC | Age: 77
End: 2017-08-29

## 2017-08-29 NOTE — TELEPHONE ENCOUNTER
----- Message from Charles Avelar DPM sent at 8/29/2017  1:00 PM CDT -----  Grayson James,    Please call the patient and get the full name of his PCP.  We need it for billing purposes.  Thanks!    Dr. Avelar

## 2017-08-30 ENCOUNTER — TELEPHONE (OUTPATIENT)
Dept: PODIATRY | Facility: CLINIC | Age: 77
End: 2017-08-30

## 2017-08-30 NOTE — TELEPHONE ENCOUNTER
Per , attempted to call patient several times to find out current PCP, No answer, No way to leave message.

## 2017-09-07 ENCOUNTER — TELEPHONE (OUTPATIENT)
Dept: PODIATRY | Facility: CLINIC | Age: 77
End: 2017-09-07

## 2017-09-07 NOTE — TELEPHONE ENCOUNTER
----- Message from Charles Avelar DPM sent at 9/7/2017  6:37 AM CDT -----  Hey Ladies,    Please call the patient and find out when he last saw his PCP and PCP's full name, as this is needed for billing purposes.  Thanks!    Dr. Avelar

## 2017-09-25 ENCOUNTER — TELEPHONE (OUTPATIENT)
Dept: DERMATOLOGY | Facility: CLINIC | Age: 77
End: 2017-09-25

## 2017-10-04 DIAGNOSIS — E11.9 DIABETES MELLITUS WITHOUT COMPLICATION: ICD-10-CM

## 2017-10-06 DIAGNOSIS — E11.9 DIABETES MELLITUS WITHOUT COMPLICATION: ICD-10-CM

## 2017-10-12 ENCOUNTER — OFFICE VISIT (OUTPATIENT)
Dept: DERMATOLOGY | Facility: CLINIC | Age: 77
End: 2017-10-12
Payer: MEDICARE

## 2017-10-12 VITALS — HEIGHT: 66 IN | WEIGHT: 179 LBS | BODY MASS INDEX: 28.77 KG/M2

## 2017-10-12 DIAGNOSIS — D18.00 ANGIOMA: ICD-10-CM

## 2017-10-12 DIAGNOSIS — L85.3 XEROSIS CUTIS: ICD-10-CM

## 2017-10-12 DIAGNOSIS — D48.5 NEOPLASM OF UNCERTAIN BEHAVIOR OF SKIN: Primary | ICD-10-CM

## 2017-10-12 DIAGNOSIS — L29.9 PRURITUS: ICD-10-CM

## 2017-10-12 PROCEDURE — 99214 OFFICE O/P EST MOD 30 MIN: CPT | Mod: 25,S$GLB,, | Performed by: DERMATOLOGY

## 2017-10-12 PROCEDURE — 11101 PR BIOPSY, EACH ADDED LESION: CPT | Mod: S$GLB,,, | Performed by: DERMATOLOGY

## 2017-10-12 PROCEDURE — 11100 PR BIOPSY OF SKIN LESION: CPT | Mod: S$GLB,,, | Performed by: DERMATOLOGY

## 2017-10-12 PROCEDURE — 99999 PR PBB SHADOW E&M-EST. PATIENT-LVL II: CPT | Mod: PBBFAC,,, | Performed by: DERMATOLOGY

## 2017-10-12 PROCEDURE — 88305 TISSUE EXAM BY PATHOLOGIST: CPT | Performed by: PATHOLOGY

## 2017-10-12 RX ORDER — FLUOCINONIDE TOPICAL SOLUTION USP, 0.05% 0.5 MG/ML
SOLUTION TOPICAL
Qty: 60 ML | Refills: 3 | Status: SHIPPED | OUTPATIENT
Start: 2017-10-12

## 2017-10-12 RX ORDER — MOMETASONE FUROATE 1 MG/G
CREAM TOPICAL
Qty: 45 G | Refills: 2 | Status: SHIPPED | OUTPATIENT
Start: 2017-10-12

## 2017-10-12 NOTE — PROGRESS NOTES
Subjective:       Patient ID:  Gumaro Broussard is a 77 y.o. male who presents for   Chief Complaint   Patient presents with    Follow-up     Last seen 8-  Pt here for follow up asteatotic eczema using TAC 0.1% ointment - has not helped much  Scalp pruritus using Hydrocortisone 2.5% lotion - has not helped much. He scratches his scalp often   He was unable to get the Clobetasol solution due to cost      Phx of SCC excised from scalp many years ago  Father hx  skin cancer          takes warm showers  Accompanied by niece today, c/o spots on chest and back, red and purple. No tx. asx      Review of Systems   Skin: Positive for itching and dry skin. Negative for daily sunscreen use.        Objective:    Physical Exam   Constitutional: He appears well-developed and well-nourished. No distress.   HENT:   Mouth/Throat: Lips normal.    Eyes: Lids are normal.    Neurological: He is alert and oriented to person, place, and time. He is not disoriented.   Psychiatric: He has a normal mood and affect.   Skin:   Areas Examined (abnormalities noted in diagram):   Scalp / Hair Palpated and Inspected  Head / Face Inspection Performed  Neck Inspection Performed  Chest / Axilla Inspection Performed  Abdomen Inspection Performed  Back Inspection Performed  RUE Inspected  LUE Inspection Performed                   Diagram Legend     Erythematous scaling macule/papule c/w actinic keratosis       Vascular papule c/w angioma      Pigmented verrucoid papule/plaque c/w seborrheic keratosis      Yellow umbilicated papule c/w sebaceous hyperplasia      Irregularly shaped tan macule c/w lentigo     1-2 mm smooth white papules consistent with Milia      Movable subcutaneous cyst with punctum c/w epidermal inclusion cyst      Subcutaneous movable cyst c/w pilar cyst      Firm pink to brown papule c/w dermatofibroma      Pedunculated fleshy papule(s) c/w skin tag(s)      Evenly pigmented macule c/w junctional nevus     Mildly  variegated pigmented, slightly irregular-bordered macule c/w mildly atypical nevus      Flesh colored to evenly pigmented papule c/w intradermal nevus       Pink pearly papule/plaque c/w basal cell carcinoma      Erythematous hyperkeratotic cursted plaque c/w SCC      Surgical scar with no sign of skin cancer recurrence      Open and closed comedones      Inflammatory papules and pustules      Verrucoid papule consistent consistent with wart     Erythematous eczematous patches and plaques     Dystrophic onycholytic nail with subungual debris c/w onychomycosis     Umbilicated papule    Erythematous-base heme-crusted tan verrucoid plaque consistent with inflamed seborrheic keratosis     Erythematous Silvery Scaling Plaque c/w Psoriasis     See annotation          Assessment / Plan:      Pathology Orders:      Normal Orders This Visit    Tissue Specimen To Pathology, Dermatology     Questions:    Directional Terms:  Other(comment)    Clinical information:  scc vs prurigo    Specific Site:  left proximal forearm    Tissue Specimen To Pathology, Dermatology     Questions:    Directional Terms:  Other(comment)    Clinical information:  scc vs prurigo    Specific Site:  left distal forearm        Neoplasm of uncertain behavior of skin  -     Tissue Specimen To Pathology, Dermatology  -     Tissue Specimen To Pathology, Dermatology    Shave biopsy procedure note:    Shave biopsy performed after verbal consent including risk of infection, scar, recurrence, need for additional treatment of site. Area prepped with alcohol, anesthetized with approximately 1.0cc of 1% lidocaine with epinephrine. Lesional tissue shaved with razor blade. Hemostasis achieved with application of aluminum chloride followed by hyfrecation. No complications. Dressing applied. Wound care explained.        Xerosis cutis  Discussed the following dry skin tips:    Avoid hot baths and showers, keep showers or baths brief (10-15 min), use a mild soap or  cleanser, pat skin dry after showering and apply a moisturizer within 3 minutes of getting out of bath (cetaphil cream, ceraVe, aquaphor) and reapply moisturizers 2-4 times/day.     -     fluocinonide (LIDEX) 0.05 % external solution; Mix into jar of CeraVe cream, aaa bid prn dryness and itching  Dispense: 60 mL; Refill: 3  -     mometasone 0.1% (ELOCON) 0.1 % cream; aaa bid prn itching , avoid chronic use  Dispense: 45 g; Refill: 2    Pruritus  Few excoriated papules on trunk, asteatotic eczema vs grovers  -     fluocinonide (LIDEX) 0.05 % external solution; Mix into jar of CeraVe cream, aaa bid prn dryness and itching  Dispense: 60 mL; Refill: 3  -     mometasone 0.1% (ELOCON) 0.1 % cream; aaa bid prn itching , avoid chronic use  Dispense: 45 g; Refill: 2    Angioma, chest and back  This is a benign vascular lesion. Reassurance given. No treatment required.                Return in about 6 months (around 4/12/2018).

## 2017-10-24 ENCOUNTER — TELEPHONE (OUTPATIENT)
Dept: NEUROLOGY | Facility: CLINIC | Age: 77
End: 2017-10-24

## 2017-10-24 NOTE — TELEPHONE ENCOUNTER
Left VM requesting callback to offer appt for memory eval here at [facility] or [place] (Homa Chao NP). Noted pt lives in [place]. Would advise appt with MYRIAM Chao for soonest/closest access.

## 2017-10-24 NOTE — TELEPHONE ENCOUNTER
----- Message from Eva Sinclair sent at 10/24/2017 11:03 AM CDT -----  Contact: sister shania morales [phone] or [phone]  Patient have short memory and his sister requesting an appt on his behalf.    sister shania morales [phone] or [phone]

## 2017-11-21 ENCOUNTER — OFFICE VISIT (OUTPATIENT)
Dept: NEUROLOGY | Facility: CLINIC | Age: 77
End: 2017-11-21
Payer: MEDICARE

## 2017-11-21 VITALS
WEIGHT: 177.75 LBS | SYSTOLIC BLOOD PRESSURE: 113 MMHG | HEIGHT: 66 IN | RESPIRATION RATE: 16 BRPM | BODY MASS INDEX: 28.57 KG/M2 | DIASTOLIC BLOOD PRESSURE: 53 MMHG | HEART RATE: 91 BPM

## 2017-11-21 DIAGNOSIS — G31.84 MCI (MILD COGNITIVE IMPAIRMENT) WITH MEMORY LOSS: Primary | ICD-10-CM

## 2017-11-21 DIAGNOSIS — I10 ESSENTIAL HYPERTENSION: ICD-10-CM

## 2017-11-21 DIAGNOSIS — E11.49 TYPE 2 DIABETES MELLITUS WITH OTHER NEUROLOGIC COMPLICATION, WITHOUT LONG-TERM CURRENT USE OF INSULIN: ICD-10-CM

## 2017-11-21 PROCEDURE — 99999 PR PBB SHADOW E&M-EST. PATIENT-LVL IV: CPT | Mod: PBBFAC,,, | Performed by: NURSE PRACTITIONER

## 2017-11-21 PROCEDURE — 99214 OFFICE O/P EST MOD 30 MIN: CPT | Mod: S$GLB,,, | Performed by: NURSE PRACTITIONER

## 2017-11-21 RX ORDER — MEMANTINE HYDROCHLORIDE 5 MG-10 MG
KIT ORAL
Qty: 28 TABLET | Refills: 0 | Status: SHIPPED | OUTPATIENT
Start: 2017-11-21 | End: 2018-02-19

## 2017-11-21 NOTE — PROGRESS NOTES
"Subjective:       Patient ID: Gumaro Broussard is a 77 y.o. male.    Chief Complaint:  Memory Loss    History of Present Illness  HPI  Dementia  He is here for evaluation and treatment of cognitive problems. He is accompanied by sister and niece.  Family noticed memory changes in October 2015. Cognitive changes have become worse in that time.. Primary caregiver is sister. The family and the patient identify problems with changes in short term memory, recalling words and repetition of questions. Has problems with directions when driving. Has some problems with appliances in the home.  Family and patient report no personality changes. Recently, he has been refusing to bathe.  It is a "grullon" to get him to shower 2 or 3 times a week. He will not allow his sister or niece to assist him.  If left to bathe on his own, he just will NOT. Sister manages his medications and his son the finances.  He continues to drive but gets lost in familiar areas.  He has no awareness of his memory deficits.  He will repeatedly eat meals or forget to eat if not reminded. At parties, he will fix multiple plates and leave around the room.  The family is aware that he is progressively become worse since 2015 but they had not considered dementia. Since no one in the family, has had dementia, they did not consider he could have dementia. Tried Donepezil in the past but was unable to tolerate to due to severe side effects.  Functional Assessment:   Activities of Daily Living (ADLs):    He is independent in the following: ambulation, feeding, continence, toileting and dressing  Requires assistance with the following: bathing and hygiene and grooming  Instrumental Activities of Daily Living (IADLs):    He is independent in the following: drives  Requires assistance with the following: family manages medications, meals, meal preparation, shopping.    Review of SystemsReview of Systems   Constitutional: Negative for activity change and " appetite change.   HENT: Negative for hearing loss.    Eyes: Negative for visual disturbance.   Respiratory: Negative for cough and wheezing.    Cardiovascular: Negative for chest pain and palpitations.   Gastrointestinal: Negative for constipation.   Genitourinary: Negative for urgency.   Musculoskeletal: Negative for back pain and gait problem.   Neurological: Negative for dizziness.        Memory loss   Psychiatric/Behavioral: Negative for behavioral problems and dysphoric mood.       Objective:      Neurologic Exam     Mental Status   Oriented to person, place, and time.   Oriented to city and area.   Disoriented to month, date, day and season. Oriented to year.   Registration of memory: recalls 4 of 5 words. Recall of objects at 5 minutes: recalls 0 of 5 words (1 with cueing) Follows 3 step commands.   Attention: decreased (5/6). Concentration: normal.   Speech: speech is normal   Level of consciousness: alert  Knowledge: poor and inconsistent with education. Unable to perform simple calculations (3/5).   Able to name object. Able to read. Able to repeat. Able to write. Normal comprehension.   MOCA was administered-  Overall Score was 19 /30   Normal>26  ( see scanned test)  Verbal fluency- 10 words; abstraction-0/2     Cranial Nerves   Cranial nerves II through XII intact.     Motor Exam   Muscle bulk: normal  Overall muscle tone: normal    Strength   Strength 5/5 throughout.     Sensory Exam   Light touch normal.   Pinprick normal.     Gait, Coordination, and Reflexes     Gait  Gait: non-neurologic    Coordination   Romberg: negative  Finger to nose coordination: normal  Heel to shin coordination: normal  Tandem walking coordination: normal    Tremor   Resting tremor: absent  Intention tremor: absent      Physical Exam   Constitutional: He is oriented to person, place, and time.   Neurological: He is oriented to person, place, and time. He has normal strength. He has a normal Finger-Nose-Finger Test, a normal  Heel to Cornelius Test, a normal Romberg Test and a normal Tandem Gait Test.   Psychiatric: His speech is normal.         Imaging  01/26/16  MRI brain  No acute intracranial abnormality identified, specifically without evidence for infarct, hemorrhage, or abnormal parenchymal enhancement.  Age-appropriate generalized cerebral volume loss and mild supratentorial white matter disease.    Labs 2/11/17  TSH 2.800  Free T4 1.06  08/15/17  HbA1c 11.6  Assessment:     Mild cognitive impairment with Memory Loss    Diagnoses:          1) Favor AD.    Medical Decision Making:  History and exam consistent with dementia - multiple cognitive domains affected ADLs with progressive course.  Best practice for evaluation and management of dementia/memory loss to follow.  Will help differentiate potential causes of progressive neurodegenerative disease.    Plan:       -Reviewed results of cognitive screen and answered questions.  -Discussed MCI vs early dementia.  Discussed the progression of the disease, stages and expectations  -Diagnostic work up- Reviewed MRI and labs with patient and family  -Discussed safety issues related to MCI- medication management, cooking, managing finances, driving  -Discussed medications for cognitive enhancement- AC EIs and Namenda CR  Will try a trial of Namenda.  Instructions given for starter pack.  Will stop medication if any adverse side effects.  Instructed to notify office of S/E.  Will increase to Namenda 10 mg twice a day if no side effects.  Family instructed to call office for maintenance dose  -Discussed NP testing  Defer  -Follow up 4 months.

## 2018-02-19 ENCOUNTER — OFFICE VISIT (OUTPATIENT)
Dept: NEUROLOGY | Facility: CLINIC | Age: 78
End: 2018-02-19
Payer: MEDICARE

## 2018-02-19 VITALS
SYSTOLIC BLOOD PRESSURE: 134 MMHG | DIASTOLIC BLOOD PRESSURE: 76 MMHG | RESPIRATION RATE: 18 BRPM | BODY MASS INDEX: 28.85 KG/M2 | HEART RATE: 95 BPM | HEIGHT: 66 IN | WEIGHT: 179.5 LBS

## 2018-02-19 DIAGNOSIS — G30.1 LATE ONSET ALZHEIMER'S DISEASE WITHOUT BEHAVIORAL DISTURBANCE: Primary | ICD-10-CM

## 2018-02-19 DIAGNOSIS — F02.80 LATE ONSET ALZHEIMER'S DISEASE WITHOUT BEHAVIORAL DISTURBANCE: Primary | ICD-10-CM

## 2018-02-19 PROCEDURE — 1126F AMNT PAIN NOTED NONE PRSNT: CPT | Mod: S$GLB,,, | Performed by: NURSE PRACTITIONER

## 2018-02-19 PROCEDURE — 99999 PR PBB SHADOW E&M-EST. PATIENT-LVL IV: CPT | Mod: PBBFAC,,, | Performed by: NURSE PRACTITIONER

## 2018-02-19 PROCEDURE — 3008F BODY MASS INDEX DOCD: CPT | Mod: S$GLB,,, | Performed by: NURSE PRACTITIONER

## 2018-02-19 PROCEDURE — 1159F MED LIST DOCD IN RCRD: CPT | Mod: S$GLB,,, | Performed by: NURSE PRACTITIONER

## 2018-02-19 PROCEDURE — 99214 OFFICE O/P EST MOD 30 MIN: CPT | Mod: S$GLB,,, | Performed by: NURSE PRACTITIONER

## 2018-02-19 RX ORDER — RIVASTIGMINE 4.6 MG/24H
1 PATCH, EXTENDED RELEASE TRANSDERMAL DAILY
Qty: 30 PATCH | Refills: 11 | Status: SHIPPED | OUTPATIENT
Start: 2018-02-19 | End: 2019-02-19

## 2018-02-19 NOTE — PROGRESS NOTES
"Subjective:       Patient ID: Gumaro Broussard is a 77 y.o. male.    Chief Complaint:  Memory Loss    History of Present Illness  HPI  Dementia  He is here for evaluation and treatment of cognitive problems. He is accompanied by sister and niece.  Family noticed memory changes in October 2015. Cognitive changes have become worse in that time. Primary caregiver is sister. The family and the patient identify problems with changes in short term memory, recalling words and repetition of questions. Has problems with directions when driving. Has some problems with appliances in the home.  Family and patient report no personality changes. Recently, he has been refusing to bathe.  It is a "grullon" to get him to shower 2 or 3 times a week. He will not allow his sister or niece to assist him.  If left to bathe on his own, he just will NOT. Sister manages his medications and his son the finances.  He continues to drive but gets lost in familiar areas.  He has no awareness of his memory deficits.  He will repeatedly eat meals or forget to eat if not reminded. At parties, he will fix multiple plates and leave around the room.  The family is aware that he is progressively become worse since 2015 but they had not considered dementia. Since no one in the family, has had dementia, they did not consider he could have dementia. Tried Donepezil in the past but was unable to tolerate to due to severe side effects.    02/19/18  Interval History.  Here for follow for memory loss.  He is accompanied by family.  Started Namenda last visit but was unable to tolerate side effects- Nausea and diarrhea  No change in cognitive function.  No behavioral issues at this time.  Continues to drive.  Family feels it is safe for him to drive at this time.  Remains active with family .  Likes to watch game shows.   Functional Assessment:   Activities of Daily Living (ADLs):    He is independent in the following: ambulation, feeding, continence, " toileting and dressing  Requires assistance with the following: bathing and hygiene and grooming  Instrumental Activities of Daily Living (IADLs):    He is independent in the following: drives  Requires assistance with the following: family manages medications, meals, meal preparation, shopping.    Review of SystemsReview of Systems   Constitutional: Negative for activity change and appetite change.   HENT: Negative for hearing loss.    Eyes: Negative for visual disturbance.   Respiratory: Negative for cough and wheezing.    Cardiovascular: Negative for chest pain and palpitations.   Gastrointestinal: Negative for constipation.   Genitourinary: Negative for urgency.   Musculoskeletal: Negative for back pain and gait problem.   Neurological: Negative for dizziness.        Memory loss   Psychiatric/Behavioral: Negative for behavioral problems and dysphoric mood.       Objective:      Neurologic Exam     Mental Status   Oriented to person, place, and time.   Oriented to city and area.   Disoriented to year, month, date, day and season.   Registration of memory: recalls 4 of 5 words. Recall of objects at 5 minutes: recalls 0 of 5 words (1 with cueing) Follows 3 step commands.   Attention: decreased (5/6). Concentration: normal.   Speech: speech is normal   Level of consciousness: alert  Knowledge: poor and inconsistent with education. Unable to perform simple calculations (3/5).   Able to name object. Able to read. Able to repeat. Able to write. Normal comprehension.   11/21/17 MOCA was administered-  Overall Score was 19 /30   Normal>26  ( see scanned test)  Verbal fluency- 10 words; abstraction-0/2     Cranial Nerves   Cranial nerves II through XII intact.     Motor Exam   Muscle bulk: normal  Overall muscle tone: normal    Strength   Strength 5/5 throughout.     Sensory Exam   Light touch normal.   Pinprick normal.     Gait, Coordination, and Reflexes     Gait  Gait: non-neurologic    Coordination   Romberg:  negative  Finger to nose coordination: normal  Heel to shin coordination: normal  Tandem walking coordination: normal    Tremor   Resting tremor: absent  Intention tremor: absent      Physical Exam   Constitutional: He is oriented to person, place, and time.   Neurological: He is oriented to person, place, and time. He has normal strength. He has a normal Finger-Nose-Finger Test, a normal Heel to Shin Test, a normal Romberg Test and a normal Tandem Gait Test.   Psychiatric: His speech is normal.         Imaging  01/26/16  MRI brain  No acute intracranial abnormality identified, specifically without evidence for infarct, hemorrhage, or abnormal parenchymal enhancement.  Age-appropriate generalized cerebral volume loss and mild supratentorial white matter disease.    Labs 2/11/17  TSH 2.800  Free T4 1.06  08/15/17  HbA1c 11.6  Assessment:     Mild cognitive impairment with Memory Loss    Diagnoses:          1) Favor AD.    Medical Decision Making:  History and exam consistent with dementia - multiple cognitive domains affected ADLs with progressive course.      Plan:     -Reviewed results of cognitive screen and answered questions.  -Discussed early dementia.  Discussed the progression of the disease, stages and expectations  -Diagnostic work up- Reviewed MRI and labs with patient and family  -Discussed safety issues related to MCI- medication management, cooking, managing finances, driving Discussed driving at length.  Advised to stop driving due to safety issues and liability.  Niece will discuss with family members.  They still feel it is safe for him to drive   Recommended Driving evaluation.  -Discussed medications for cognitive enhancement- AChEIs and Namenda CR Unable to tolerate Donepezil or Namenda. Will try a trial of Exelon patch. Will start with Exelon patch 4.6 mg daily  Instructions for use given to family.  Will stop medication if any adverse side effects.  Instructed to notify office of  S/E.  -Discussed NP testing  Defer  -Follow up 6 months.

## 2018-02-22 ENCOUNTER — PES CALL (OUTPATIENT)
Dept: ADMINISTRATIVE | Facility: CLINIC | Age: 78
End: 2018-02-22

## 2018-03-05 ENCOUNTER — PES CALL (OUTPATIENT)
Dept: ADMINISTRATIVE | Facility: CLINIC | Age: 78
End: 2018-03-05

## 2018-03-21 ENCOUNTER — PES CALL (OUTPATIENT)
Dept: ADMINISTRATIVE | Facility: CLINIC | Age: 78
End: 2018-03-21

## 2018-03-22 ENCOUNTER — PES CALL (OUTPATIENT)
Dept: ADMINISTRATIVE | Facility: CLINIC | Age: 78
End: 2018-03-22

## 2018-03-26 ENCOUNTER — PES CALL (OUTPATIENT)
Dept: ADMINISTRATIVE | Facility: CLINIC | Age: 78
End: 2018-03-26

## 2018-06-07 ENCOUNTER — OFFICE VISIT (OUTPATIENT)
Dept: PODIATRY | Facility: CLINIC | Age: 78
End: 2018-06-07
Payer: MEDICARE

## 2018-06-07 VITALS — RESPIRATION RATE: 14 BRPM | HEIGHT: 66 IN | BODY MASS INDEX: 29.27 KG/M2 | WEIGHT: 182.13 LBS

## 2018-06-07 DIAGNOSIS — B35.1 ONYCHOMYCOSIS: ICD-10-CM

## 2018-06-07 DIAGNOSIS — M20.41 HAMMER TOES, BILATERAL: ICD-10-CM

## 2018-06-07 DIAGNOSIS — E11.42 DIABETIC POLYNEUROPATHY ASSOCIATED WITH TYPE 2 DIABETES MELLITUS: Primary | ICD-10-CM

## 2018-06-07 DIAGNOSIS — M20.42 HAMMER TOES, BILATERAL: ICD-10-CM

## 2018-06-07 PROCEDURE — 99999 PR PBB SHADOW E&M-EST. PATIENT-LVL III: CPT | Mod: PBBFAC,,, | Performed by: PODIATRIST

## 2018-06-07 PROCEDURE — 11721 DEBRIDE NAIL 6 OR MORE: CPT | Mod: Q9,S$GLB,, | Performed by: PODIATRIST

## 2018-06-07 PROCEDURE — 99499 UNLISTED E&M SERVICE: CPT | Mod: S$PBB,,, | Performed by: PODIATRIST

## 2018-06-07 PROCEDURE — 99213 OFFICE O/P EST LOW 20 MIN: CPT | Mod: 25,S$GLB,, | Performed by: PODIATRIST

## 2018-06-07 RX ORDER — INSULIN DETEMIR 100 [IU]/ML
INJECTION, SOLUTION SUBCUTANEOUS
Refills: 5 | COMMUNITY
Start: 2018-05-22

## 2018-06-07 RX ORDER — ACETIC ACID 20.65 MG/ML
SOLUTION AURICULAR (OTIC)
Refills: 0 | COMMUNITY
Start: 2018-05-15

## 2018-06-07 NOTE — PROGRESS NOTES
Subjective:      Patient ID: Gumaro Broussard is a 78 y.o. male.    Chief Complaint: Diabetic Foot Exam    Gumaro is a 78 y.o. male who presents to the clinic for evaluation and treatment of diabetic feet. Gumaro has a past medical history of Diabetes mellitus type II; External hemorrhoids; GERD (gastroesophageal reflux disease); and Hypertension. Patient relates no major problem with feet. Only complaints today consist of toenails in need of trimming.  Denies being painful with wearing shoe gear.  Has not attempted to self treat.  Denies any additional pedal complaints.    PCP: Dr. Alfaro    Hemoglobin A1C   Date Value Ref Range Status   02/14/2018 11.5 (H) 4.0 - 5.6 % Final     Comment:     According to ADA guidelines, hemoglobin A1c <7.0% represents  optimal control in non-pregnant diabetic patients. Different  metrics may apply to specific patient populations.   Standards of Medical Care in Diabetes-2016.  For the purpose of screening for the presence of diabetes:  <5.7%     Consistent with the absence of diabetes  5.7-6.4%  Consistent with increasing risk for diabetes   (prediabetes)  >or=6.5%  Consistent with diabetes  Currently, no consensus exists for use of hemoglobin A1c  for diagnosis of diabetes for children.  This Hemoglobin A1c assay has significant interference with fetal   hemoglobin   (HbF). The results are invalid for patients with abnormal amounts of   HbF,   including those with known Hereditary Persistence   of Fetal Hemoglobin. Heterozygous hemoglobin variants (HbAS, HbAC,   HbAD, HbAE, HbA2) do not significantly interfere with this assay;   however, presence of multiple variants in a sample may impact the %   interference.     08/15/2017 11.6 (H) 4.0 - 5.6 % Final     Comment:     According to ADA guidelines, hemoglobin A1c <7.0% represents  optimal control in non-pregnant diabetic patients. Different  metrics may apply to specific patient populations.   Standards of Medical  Care in Diabetes-2016.  For the purpose of screening for the presence of diabetes:  <5.7%     Consistent with the absence of diabetes  5.7-6.4%  Consistent with increasing risk for diabetes   (prediabetes)  >or=6.5%  Consistent with diabetes  Currently, no consensus exists for use of hemoglobin A1c  for diagnosis of diabetes for children.  This Hemoglobin A1c assay has significant interference with fetal   hemoglobin   (HbF). The results are invalid for patients with abnormal amounts of   HbF,   including those with known Hereditary Persistence   of Fetal Hemoglobin. Heterozygous hemoglobin variants (HbAS, HbAC,   HbAD, HbAE, HbA2) do not significantly interfere with this assay;   however, presence of multiple variants in a sample may impact the %   interference.     02/11/2017 11.1 (H) 0.0 - 5.6 % Final     Comment:     Reference Interval:  5.0 - 5.6 Normal   5.7 - 6.4 High Risk   > 6.5 Diabetic    Hgb A1c results are standardized based on the (NGSP) National   Glycohemoglobin Standardization Program.    Hemoglobin A1C levels are related to mean serum/plasma glucose   during the preceding 2-3 months.                Past Medical History:   Diagnosis Date    Diabetes mellitus type II     External hemorrhoids     GERD (gastroesophageal reflux disease)     Hypertension        Past Surgical History:   Procedure Laterality Date    ADENOIDECTOMY      CARPAL TUNNEL RELEASE Right     COLONOSCOPY      COLONOSCOPY N/A 11/9/2015    Procedure: COLONOSCOPY;  Surgeon: Brigido Acevedo MD;  Location: [address]);  Service: Endoscopy;  Laterality: N/A;  f/u 3 yrs     KNEE ARTHROSCOPY Right     TONSILLECTOMY      VASECTOMY         Family History   Problem Relation Age of Onset    Diabetes Mother     Cancer Mother         Liver    Liver cancer Mother     Heart disease Father     COPD Father     No Known Problems Sister     No Known Problems Son     Prostate cancer Neg Hx     Kidney disease Neg Hx         Social History     Social History    Marital status:      Spouse name: N/A    Number of children: N/A    Years of education: N/A     Social History Main Topics    Smoking status: Former Smoker     Packs/day: 0.50     Years: 2.00     Quit date: 1/1/1964    Smokeless tobacco: Never Used    Alcohol use Yes      Comment: occacional wine or beer    Drug use: No    Sexual activity: Not Currently     Partners: Female     Other Topics Concern    None     Social History Narrative    None       Current Outpatient Prescriptions   Medication Sig Dispense Refill    acetic acid (VOSOL) 2 % otic solution INT 4 GTS IN THE RIGHT EAR QID FOR 1 WEEK  0    aspirin 81 MG Chew Take 81 mg by mouth once daily.      clobetasol (TEMOVATE) 0.05 % external solution Mix into jar of CeraVe cream and trunk and extremities bid prn 60 mL 3    fluocinonide (LIDEX) 0.05 % external solution Mix into jar of CeraVe cream, aaa bid prn dryness and itching 60 mL 3    hydrocortisone 2.5 % lotion aaa scalp bid prn itching 59 mL 2    JANUMET XR 50-1,000 mg TM24       JANUVIA 100 mg Tab TK 1 T PO QD  5    LEVEMIR FLEXTOUCH U-100 INSULN 100 unit/mL (3 mL) InPn pen INJECT 10 UNITS UNDER THE SKIN QAM  5    lisinopril (PRINIVIL,ZESTRIL) 2.5 MG tablet       metformin (GLUCOPHAGE) 500 MG tablet Take 500 mg by mouth 2 (two) times daily with meals.      mometasone 0.1% (ELOCON) 0.1 % cream aaa bid prn itching , avoid chronic use 45 g 2    MULTIVIT-MIN/FA/LYCOPEN/LUTEIN (CENTRUM SILVER ULTRA MEN'S ORAL) Take 1 tablet by mouth once daily.      pantoprazole (PROTONIX) 40 MG tablet TAKE ONE TABLET BY MOUTH ONCE DAILY BEFORE BREAKFAST 90 tablet 0    rivastigmine (EXELON) 4.6 mg/24 hr PT24 Place 1 patch onto the skin once daily. 30 patch 11    triamcinolone acetonide 0.1% (KENALOG) 0.1 % ointment AAA back bid x 1-2 weeks then prn, avoid chronic use 60 g 1     No current facility-administered medications for this visit.        Review of  patient's allergies indicates:  No Known Allergies      Review of Systems   Constitution: Negative for chills and fever.   Cardiovascular: Negative for claudication and leg swelling.   Skin: Positive for color change, dry skin and nail changes.   Musculoskeletal: Negative for joint pain and joint swelling.   Neurological: Negative for numbness and paresthesias.   Psychiatric/Behavioral: Negative for altered mental status.           Objective:      Physical Exam   Constitutional: He is oriented to person, place, and time. He appears well-developed and well-nourished. No distress.   Cardiovascular:   Pulses:       Dorsalis pedis pulses are 2+ on the right side, and 2+ on the left side.        Posterior tibial pulses are 1+ on the right side, and 1+ on the left side.   CFT <3 seconds bilateral.  Pedal hair growth decreased bilateral.  Varicosities noted bilateral.  Mild nonpitting edema noted bilateral.  Toes are cool to touch bilateral.     Musculoskeletal: He exhibits no edema or tenderness.   Muscle strength 5/5 in all muscle groups bilateral.  No tenderness nor crepitation with ROM of foot/ankle joints bilateral.  No tenderness with palpation of bilateral foot and ankle.  Bilateral pes cavus foot type.  Bilateral hallux abducto valgus.  Bilateral tailor's bunion.  Bilateral semi-rigid contracture of toes 2-5.   Neurological: He is alert and oriented to person, place, and time. He has normal strength. No sensory deficit.   Protective sensation per [place] monofilament intact bilateral.    Vibratory sensation intact bilateral.    Light touch intact bilateral.   Skin: Skin is warm, dry and intact. Capillary refill takes less than 2 seconds. No abrasion, no bruising, no burn, no ecchymosis, no laceration, no lesion, no petechiae and no rash noted. He is not diaphoretic. No erythema. No pallor.   Pedal skin appears thin and atrophic bilateral.  Toenails x 10 appear thickened by 2 mm's, elongated by 8 mm's, and  discolored with subungual debris. Examination of the skin reveals no evidence of significant maceration, rashes, open lesions, suspicious appearing nevi or other concerning lesions.              Assessment:       Encounter Diagnoses   Name Primary?    Diabetic polyneuropathy associated with type 2 diabetes mellitus Yes    Hammer toes, bilateral     Onychomycosis          Plan:       Gumaro was seen today for diabetic foot exam.    Diagnoses and all orders for this visit:    Diabetic polyneuropathy associated with type 2 diabetes mellitus    Hammer toes, bilateral    Onychomycosis      I counseled the patient on his conditions, their implications and medical management.    Advised to wear shoes that accommodate for digital deformities.    Shoe inspection. Diabetic Foot Education. Patient reminded of the importance of good nutrition and blood sugar control to help prevent podiatric complications of diabetes. Patient instructed on proper foot hygeine. We discussed wearing proper shoe gear, daily foot inspections, never walking without protective shoe gear, never putting sharp instruments to feet    With patient's permission, nails were aggressively reduced and debrided x 10 to their soft tissue attachment mechanically and with electric , removing all offending nail and debris. Patient relates relief following the procedure. He will continue to monitor the areas daily, inspect his feet, wear protective shoe gear when ambulatory, moisturizer to maintain skin integrity and follow in this office in approximately 6 months, sooner p.r.n.    Follow-up in about 6 months (around 12/7/2018).    Charles Avelar DPM

## 2018-06-27 ENCOUNTER — PES CALL (OUTPATIENT)
Dept: ADMINISTRATIVE | Facility: CLINIC | Age: 78
End: 2018-06-27

## 2018-08-03 ENCOUNTER — PES CALL (OUTPATIENT)
Dept: ADMINISTRATIVE | Facility: CLINIC | Age: 78
End: 2018-08-03

## 2018-08-29 ENCOUNTER — CLINICAL SUPPORT (OUTPATIENT)
Dept: AUDIOLOGY | Facility: CLINIC | Age: 78
End: 2018-08-29
Payer: MEDICARE

## 2018-08-29 ENCOUNTER — OFFICE VISIT (OUTPATIENT)
Dept: OTOLARYNGOLOGY | Facility: CLINIC | Age: 78
End: 2018-08-29
Payer: MEDICARE

## 2018-08-29 VITALS
SYSTOLIC BLOOD PRESSURE: 119 MMHG | DIASTOLIC BLOOD PRESSURE: 71 MMHG | WEIGHT: 180.56 LBS | HEART RATE: 86 BPM | BODY MASS INDEX: 26.74 KG/M2 | HEIGHT: 69 IN

## 2018-08-29 DIAGNOSIS — H90.A32 MIXED CONDUCTIVE AND SENSORINEURAL HEARING LOSS OF LEFT EAR WITH RESTRICTED HEARING OF RIGHT EAR: ICD-10-CM

## 2018-08-29 DIAGNOSIS — H90.A21 SENSORINEURAL HEARING LOSS (SNHL) OF RIGHT EAR WITH RESTRICTED HEARING OF LEFT EAR: Primary | ICD-10-CM

## 2018-08-29 DIAGNOSIS — H90.3 BILATERAL SENSORINEURAL HEARING LOSS: Primary | ICD-10-CM

## 2018-08-29 DIAGNOSIS — H61.23 BILATERAL IMPACTED CERUMEN: ICD-10-CM

## 2018-08-29 DIAGNOSIS — H93.293 IMPAIRED AUDITORY DISCRIMINATION, BILATERAL: ICD-10-CM

## 2018-08-29 DIAGNOSIS — H91.90 HEARING DIFFICULTY, UNSPECIFIED LATERALITY: Primary | ICD-10-CM

## 2018-08-29 PROCEDURE — 99214 OFFICE O/P EST MOD 30 MIN: CPT | Mod: 25,S$GLB,, | Performed by: NURSE PRACTITIONER

## 2018-08-29 PROCEDURE — 3074F SYST BP LT 130 MM HG: CPT | Mod: CPTII,S$GLB,, | Performed by: NURSE PRACTITIONER

## 2018-08-29 PROCEDURE — G0268 REMOVAL OF IMPACTED WAX MD: HCPCS | Mod: S$GLB,,, | Performed by: NURSE PRACTITIONER

## 2018-08-29 PROCEDURE — 92557 COMPREHENSIVE HEARING TEST: CPT | Mod: S$GLB,,, | Performed by: AUDIOLOGIST

## 2018-08-29 PROCEDURE — 3078F DIAST BP <80 MM HG: CPT | Mod: CPTII,S$GLB,, | Performed by: NURSE PRACTITIONER

## 2018-08-29 PROCEDURE — 92567 TYMPANOMETRY: CPT | Mod: S$GLB,,, | Performed by: AUDIOLOGIST

## 2018-08-29 PROCEDURE — 99999 PR PBB SHADOW E&M-EST. PATIENT-LVL I: CPT | Mod: PBBFAC,,,

## 2018-08-29 PROCEDURE — 99999 PR PBB SHADOW E&M-EST. PATIENT-LVL III: CPT | Mod: PBBFAC,,, | Performed by: NURSE PRACTITIONER

## 2018-08-29 NOTE — PROGRESS NOTES
Subjective:       Patient ID: Gumaro Broussard is a 78 y.o. male.    Chief Complaint: Cerumen Impaction    HPI   Patient seen one year ago to his ears cleaned out and for audiogram. He returns today for same. He denies otalgia, otorrhea, or other ENT symptom or concern at this time.     Review of Systems   Constitutional: Negative.    HENT: Positive for hearing loss.    Eyes: Negative.    Respiratory: Negative.    Cardiovascular: Negative.    Gastrointestinal: Negative.    Musculoskeletal: Negative.    Skin: Negative.    Neurological: Negative.    Hematological: Negative.    Psychiatric/Behavioral: Negative.        Objective:      Physical Exam   Constitutional: He is oriented to person, place, and time. Vital signs are normal. He appears well-developed and well-nourished. He is cooperative. He does not appear ill. No distress.   HENT:   Head: Normocephalic and atraumatic.   Right Ear: Hearing, tympanic membrane, external ear and ear canal normal. Tympanic membrane is not erythematous. No middle ear effusion.   Left Ear: Hearing, tympanic membrane, external ear and ear canal normal. Tympanic membrane is not erythematous.  No middle ear effusion.   Nose: Nose normal. No mucosal edema or rhinorrhea. Right sinus exhibits no maxillary sinus tenderness and no frontal sinus tenderness. Left sinus exhibits no maxillary sinus tenderness and no frontal sinus tenderness.   Mouth/Throat: Uvula is midline, oropharynx is clear and moist and mucous membranes are normal. Mucous membranes are not pale, not dry and not cyanotic. No oral lesions. No oropharyngeal exudate, posterior oropharyngeal edema or posterior oropharyngeal erythema.   Eyes: Conjunctivae, EOM and lids are normal. Pupils are equal, round, and reactive to light. Right eye exhibits no discharge. Left eye exhibits no discharge. No scleral icterus.   Neck: Trachea normal and normal range of motion. Neck supple. No tracheal deviation present. No thyroid mass and  no thyromegaly present.   Cardiovascular: Normal rate.   Pulmonary/Chest: Effort normal. No stridor. No respiratory distress. He has no wheezes.   Musculoskeletal: Normal range of motion.   Lymphadenopathy:        Head (right side): No submental, no submandibular, no tonsillar, no preauricular and no posterior auricular adenopathy present.        Head (left side): No submental, no submandibular, no tonsillar, no preauricular and no posterior auricular adenopathy present.     He has no cervical adenopathy.        Right cervical: No superficial cervical and no posterior cervical adenopathy present.       Left cervical: No superficial cervical and no posterior cervical adenopathy present.   Neurological: He is alert and oriented to person, place, and time. He has normal strength. Coordination and gait normal.   Skin: Skin is warm, dry and intact. No lesion and no rash noted. He is not diaphoretic. No cyanosis. No pallor.   Psychiatric: He has a normal mood and affect. His speech is normal and behavior is normal. Judgment and thought content normal. Cognition and memory are normal.   Nursing note and vitals reviewed.      SEPARATE PROCEDURE IN OFFICE:   Procedure: Removal of impacted cerumen, bilateral   Pre Procedure Diagnosis: Cerumen Impaction   Post Procedure Diagnosis: Cerumen Impaction   Verbal informed consent in regards to risk of trauma to ear canal, ear drum or hearing, discomfort during procedure and/or inability to remove cerumen impaction in one session or unforeseen events or complications.   No anesthesia.     Procedure in detail:   Ear canal visualized bilateral with appropriate size ear speculum utilizing Operating Head Binocular Otomicroscope   Utilizing the following: Ring curet, delicate alligator forceps, and/or suction cannula. The impacted cerumen of the ear canals was removed atraumatically. The TM and EAC were then inspected and found to be clear of wax. See description of TMs/EACs in PE above.    Complications: No   Condition: Improved/Good    Assessment:     Cerumen impactions removed AU    Mild/moderate to severe/profound SNHL AU  Plan:     PATIENT IS MEDICALLY CLEARED FOR HEARING AIDS.   Today's audiogram reveals the patient is a candidate for amplification. Audiogram is reviewed in detail with the patient. The audiologist's recommendation that the patient have amplification/hearing aids is discussed and questions answered. Patient has been given information by the audiologist on how to schedule a hearing aid consultation. Patient is encouraged to wear ear protection in loud noise and return annually for hearing test. Return to clinic as needed for further ENT concerns.

## 2019-02-27 ENCOUNTER — LAB VISIT (OUTPATIENT)
Dept: LAB | Facility: HOSPITAL | Age: 79
End: 2019-02-27
Attending: INTERNAL MEDICINE
Payer: MEDICARE

## 2019-02-27 DIAGNOSIS — Z79.82 ENCOUNTER FOR LONG-TERM (CURRENT) USE OF ASPIRIN: Primary | ICD-10-CM

## 2019-02-27 DIAGNOSIS — I10 ESSENTIAL HYPERTENSION, MALIGNANT: ICD-10-CM

## 2019-02-27 DIAGNOSIS — K21.9 ESOPHAGEAL REFLUX: ICD-10-CM

## 2019-02-27 DIAGNOSIS — F02.80 ALZHEIMER'S DISEASE: ICD-10-CM

## 2019-02-27 DIAGNOSIS — G30.9 ALZHEIMER'S DISEASE: ICD-10-CM

## 2019-02-27 LAB
ALBUMIN/CREAT UR: 12.9 UG/MG
ANION GAP SERPL CALC-SCNC: 9 MMOL/L
BUN SERPL-MCNC: 15 MG/DL
CALCIUM SERPL-MCNC: 9.2 MG/DL
CHLORIDE SERPL-SCNC: 96 MMOL/L
CHOLEST SERPL-MCNC: 159 MG/DL
CHOLEST/HDLC SERPL: 4.2 {RATIO}
CO2 SERPL-SCNC: 28 MMOL/L
CREAT SERPL-MCNC: 1 MG/DL
CREAT UR-MCNC: 93 MG/DL
EST. GFR  (AFRICAN AMERICAN): >60 ML/MIN/1.73 M^2
EST. GFR  (NON AFRICAN AMERICAN): >60 ML/MIN/1.73 M^2
ESTIMATED AVG GLUCOSE: 298 MG/DL
GLUCOSE SERPL-MCNC: 364 MG/DL
HBA1C MFR BLD HPLC: 12 %
HDLC SERPL-MCNC: 38 MG/DL
HDLC SERPL: 23.9 %
LDLC SERPL CALC-MCNC: 87.6 MG/DL
MICROALBUMIN UR DL<=1MG/L-MCNC: 12 UG/ML
NONHDLC SERPL-MCNC: 121 MG/DL
POTASSIUM SERPL-SCNC: 4.5 MMOL/L
SODIUM SERPL-SCNC: 133 MMOL/L
TRIGL SERPL-MCNC: 167 MG/DL

## 2019-02-27 PROCEDURE — 36415 COLL VENOUS BLD VENIPUNCTURE: CPT | Mod: HCNC,PO

## 2019-02-27 PROCEDURE — 83036 HEMOGLOBIN GLYCOSYLATED A1C: CPT | Mod: HCNC

## 2019-02-27 PROCEDURE — 80048 BASIC METABOLIC PNL TOTAL CA: CPT | Mod: HCNC

## 2019-02-27 PROCEDURE — 82043 UR ALBUMIN QUANTITATIVE: CPT | Mod: HCNC

## 2019-02-27 PROCEDURE — 80061 LIPID PANEL: CPT | Mod: HCNC

## 2020-01-10 ENCOUNTER — OFFICE VISIT (OUTPATIENT)
Dept: OTOLARYNGOLOGY | Facility: CLINIC | Age: 80
End: 2020-01-10
Payer: MEDICARE

## 2020-01-10 VITALS — WEIGHT: 180.56 LBS | BODY MASS INDEX: 26.74 KG/M2 | HEIGHT: 69 IN

## 2020-01-10 DIAGNOSIS — H93.293 IMPAIRED AUDITORY DISCRIMINATION, BILATERAL: ICD-10-CM

## 2020-01-10 DIAGNOSIS — H90.3 BILATERAL SENSORINEURAL HEARING LOSS: ICD-10-CM

## 2020-01-10 DIAGNOSIS — H61.23 BILATERAL IMPACTED CERUMEN: Primary | ICD-10-CM

## 2020-01-10 PROCEDURE — 69210 PR REMOVAL IMPACTED CERUMEN REQUIRING INSTRUMENTATION, UNILATERAL: ICD-10-PCS | Mod: HCNC,S$GLB,, | Performed by: NURSE PRACTITIONER

## 2020-01-10 PROCEDURE — 69210 REMOVE IMPACTED EAR WAX UNI: CPT | Mod: HCNC,S$GLB,, | Performed by: NURSE PRACTITIONER

## 2020-01-10 PROCEDURE — 99999 PR PBB SHADOW E&M-EST. PATIENT-LVL II: ICD-10-PCS | Mod: PBBFAC,HCNC,, | Performed by: NURSE PRACTITIONER

## 2020-01-10 PROCEDURE — 99499 NO LOS: ICD-10-PCS | Mod: HCNC,S$GLB,, | Performed by: NURSE PRACTITIONER

## 2020-01-10 PROCEDURE — 99999 PR PBB SHADOW E&M-EST. PATIENT-LVL II: CPT | Mod: PBBFAC,HCNC,, | Performed by: NURSE PRACTITIONER

## 2020-01-10 PROCEDURE — 99499 UNLISTED E&M SERVICE: CPT | Mod: HCNC,S$GLB,, | Performed by: NURSE PRACTITIONER

## 2020-01-10 NOTE — PROGRESS NOTES
Subjective:       Patient ID: Gumaro Broussard is a 79 y.o. male.    Chief Complaint: Ear Problem (decreased hearing over the past 2 months-h/o ear wax)    HPI   Patient seen >one year ago to his ears cleaned out and for audiogram. He returns today for ear cleaning. He denies otalgia, otorrhea, or other ENT symptom or concern at this time.     Review of Systems   Constitutional: Negative.    HENT: Positive for hearing loss.    Eyes: Negative.    Respiratory: Negative.    Cardiovascular: Negative.    Gastrointestinal: Negative.    Musculoskeletal: Negative.    Skin: Negative.    Neurological: Negative.    Hematological: Negative.    Psychiatric/Behavioral: Negative.        Objective:      Physical Exam   Constitutional: He is oriented to person, place, and time. Vital signs are normal. He appears well-developed and well-nourished. He is cooperative. He does not appear ill. No distress.   HENT:   Head: Normocephalic and atraumatic.   Right Ear: Hearing, tympanic membrane, external ear and ear canal normal. Tympanic membrane is not erythematous. No middle ear effusion.   Left Ear: Hearing, tympanic membrane, external ear and ear canal normal. Tympanic membrane is not erythematous.  No middle ear effusion.   Nose: Nose normal.   Mouth/Throat: Oropharynx is clear and moist and mucous membranes are normal.   Eyes: EOM and lids are normal. Right eye exhibits no discharge. Left eye exhibits no discharge. No scleral icterus.   Neck: Trachea normal and normal range of motion. Neck supple. No tracheal deviation present.   Cardiovascular: Normal rate.   Pulmonary/Chest: Effort normal. No stridor. No respiratory distress. He has no wheezes.   Musculoskeletal: Normal range of motion.   Neurological: He is alert and oriented to person, place, and time. He has normal strength. Coordination and gait normal.   Skin: Skin is warm, dry and intact. No lesion and no rash noted. He is not diaphoretic. No cyanosis. No pallor.    Psychiatric: He has a normal mood and affect. His speech is normal and behavior is normal. Judgment and thought content normal. Cognition and memory are normal.   Nursing note and vitals reviewed.      SEPARATE PROCEDURE IN OFFICE:   Procedure: Removal of impacted cerumen, bilateral   Pre Procedure Diagnosis: Cerumen Impaction   Post Procedure Diagnosis: Cerumen Impaction   Verbal informed consent in regards to risk of trauma to ear canal, ear drum or hearing, discomfort during procedure and/or inability to remove cerumen impaction in one session or unforeseen events or complications.   No anesthesia.     Procedure in detail:   Ear canal visualized bilateral with appropriate size ear speculum utilizing Operating Head Binocular Otomicroscope   Utilizing the following: Ring curet, delicate alligator forceps, and/or suction cannula. The impacted cerumen of the ear canals was removed atraumatically. The TM and EAC were then inspected and found to be clear of wax. See description of TMs/EACs in PE above.   Complications: No   Condition: Improved/Good    Assessment:     Cerumen impactions removed AU    H/o moderate to profound SNHL AU  He wears OTC amplifiers in his ears  Plan:     PATIENT was previously MEDICALLY CLEARED FOR HEARING AIDS.    Return to clinic as needed for further ENT concerns.

## 2020-09-29 ENCOUNTER — PATIENT MESSAGE (OUTPATIENT)
Dept: OTHER | Facility: OTHER | Age: 80
End: 2020-09-29

## 2020-11-09 ENCOUNTER — PES CALL (OUTPATIENT)
Dept: ADMINISTRATIVE | Facility: CLINIC | Age: 80
End: 2020-11-09

## 2020-12-11 ENCOUNTER — PATIENT MESSAGE (OUTPATIENT)
Dept: OTHER | Facility: OTHER | Age: 80
End: 2020-12-11

## 2021-03-18 ENCOUNTER — OFFICE VISIT (OUTPATIENT)
Dept: OTOLARYNGOLOGY | Facility: CLINIC | Age: 81
End: 2021-03-18
Payer: MEDICARE

## 2021-03-18 VITALS — BODY MASS INDEX: 25.86 KG/M2 | HEIGHT: 69 IN | WEIGHT: 174.63 LBS

## 2021-03-18 DIAGNOSIS — H90.3 BILATERAL SENSORINEURAL HEARING LOSS: ICD-10-CM

## 2021-03-18 DIAGNOSIS — H61.23 BILATERAL IMPACTED CERUMEN: Primary | ICD-10-CM

## 2021-03-18 PROCEDURE — 1101F PR PT FALLS ASSESS DOC 0-1 FALLS W/OUT INJ PAST YR: ICD-10-PCS | Mod: CPTII,S$GLB,, | Performed by: NURSE PRACTITIONER

## 2021-03-18 PROCEDURE — 1126F PR PAIN SEVERITY QUANTIFIED, NO PAIN PRESENT: ICD-10-PCS | Mod: S$GLB,,, | Performed by: NURSE PRACTITIONER

## 2021-03-18 PROCEDURE — 99499 NO LOS: ICD-10-PCS | Mod: S$GLB,,, | Performed by: NURSE PRACTITIONER

## 2021-03-18 PROCEDURE — 3288F FALL RISK ASSESSMENT DOCD: CPT | Mod: CPTII,S$GLB,, | Performed by: NURSE PRACTITIONER

## 2021-03-18 PROCEDURE — 3288F PR FALLS RISK ASSESSMENT DOCUMENTED: ICD-10-PCS | Mod: CPTII,S$GLB,, | Performed by: NURSE PRACTITIONER

## 2021-03-18 PROCEDURE — 69210 PR REMOVAL IMPACTED CERUMEN REQUIRING INSTRUMENTATION, UNILATERAL: ICD-10-PCS | Mod: S$GLB,,, | Performed by: NURSE PRACTITIONER

## 2021-03-18 PROCEDURE — 99999 PR PBB SHADOW E&M-EST. PATIENT-LVL III: CPT | Mod: PBBFAC,,, | Performed by: NURSE PRACTITIONER

## 2021-03-18 PROCEDURE — 99499 UNLISTED E&M SERVICE: CPT | Mod: S$GLB,,, | Performed by: NURSE PRACTITIONER

## 2021-03-18 PROCEDURE — 1126F AMNT PAIN NOTED NONE PRSNT: CPT | Mod: S$GLB,,, | Performed by: NURSE PRACTITIONER

## 2021-03-18 PROCEDURE — 69210 REMOVE IMPACTED EAR WAX UNI: CPT | Mod: S$GLB,,, | Performed by: NURSE PRACTITIONER

## 2021-03-18 PROCEDURE — 99999 PR PBB SHADOW E&M-EST. PATIENT-LVL III: ICD-10-PCS | Mod: PBBFAC,,, | Performed by: NURSE PRACTITIONER

## 2021-03-18 PROCEDURE — 1101F PT FALLS ASSESS-DOCD LE1/YR: CPT | Mod: CPTII,S$GLB,, | Performed by: NURSE PRACTITIONER

## 2021-07-15 ENCOUNTER — OFFICE VISIT (OUTPATIENT)
Dept: OPHTHALMOLOGY | Facility: CLINIC | Age: 81
End: 2021-07-15
Payer: MEDICARE

## 2021-07-15 DIAGNOSIS — E78.2 DYSLIPIDEMIA WITH LOW HIGH DENSITY LIPOPROTEIN (HDL) CHOLESTEROL WITH HYPERTRIGLYCERIDEMIA DUE TO TYPE 2 DIABETES MELLITUS: ICD-10-CM

## 2021-07-15 DIAGNOSIS — E11.9 DIABETES MELLITUS TYPE 2 WITHOUT RETINOPATHY: Primary | ICD-10-CM

## 2021-07-15 DIAGNOSIS — E11.36 DIABETIC CATARACT OF BOTH EYES: ICD-10-CM

## 2021-07-15 DIAGNOSIS — E11.69 DYSLIPIDEMIA WITH LOW HIGH DENSITY LIPOPROTEIN (HDL) CHOLESTEROL WITH HYPERTRIGLYCERIDEMIA DUE TO TYPE 2 DIABETES MELLITUS: ICD-10-CM

## 2021-07-15 PROCEDURE — 92004 PR EYE EXAM, NEW PATIENT,COMPREHESV: ICD-10-PCS | Mod: S$GLB,,, | Performed by: OPHTHALMOLOGY

## 2021-07-15 PROCEDURE — 1101F PT FALLS ASSESS-DOCD LE1/YR: CPT | Mod: CPTII,S$GLB,, | Performed by: OPHTHALMOLOGY

## 2021-07-15 PROCEDURE — 92004 COMPRE OPH EXAM NEW PT 1/>: CPT | Mod: S$GLB,,, | Performed by: OPHTHALMOLOGY

## 2021-07-15 PROCEDURE — 99999 PR PBB SHADOW E&M-EST. PATIENT-LVL III: CPT | Mod: PBBFAC,,, | Performed by: OPHTHALMOLOGY

## 2021-07-15 PROCEDURE — 3288F FALL RISK ASSESSMENT DOCD: CPT | Mod: CPTII,S$GLB,, | Performed by: OPHTHALMOLOGY

## 2021-07-15 PROCEDURE — 99999 PR PBB SHADOW E&M-EST. PATIENT-LVL III: ICD-10-PCS | Mod: PBBFAC,,, | Performed by: OPHTHALMOLOGY

## 2021-07-15 PROCEDURE — 1101F PR PT FALLS ASSESS DOC 0-1 FALLS W/OUT INJ PAST YR: ICD-10-PCS | Mod: CPTII,S$GLB,, | Performed by: OPHTHALMOLOGY

## 2021-07-15 PROCEDURE — 92015 DETERMINE REFRACTIVE STATE: CPT | Mod: S$GLB,,, | Performed by: OPHTHALMOLOGY

## 2021-07-15 PROCEDURE — 1126F AMNT PAIN NOTED NONE PRSNT: CPT | Mod: S$GLB,,, | Performed by: OPHTHALMOLOGY

## 2021-07-15 PROCEDURE — 1126F PR PAIN SEVERITY QUANTIFIED, NO PAIN PRESENT: ICD-10-PCS | Mod: S$GLB,,, | Performed by: OPHTHALMOLOGY

## 2021-07-15 PROCEDURE — 92015 PR REFRACTION: ICD-10-PCS | Mod: S$GLB,,, | Performed by: OPHTHALMOLOGY

## 2021-07-15 PROCEDURE — 3288F PR FALLS RISK ASSESSMENT DOCUMENTED: ICD-10-PCS | Mod: CPTII,S$GLB,, | Performed by: OPHTHALMOLOGY

## 2021-07-15 RX ORDER — FLASH GLUCOSE SENSOR
KIT MISCELLANEOUS
COMMUNITY
Start: 2021-06-11

## 2021-07-15 RX ORDER — PEN NEEDLE, DIABETIC 32GX 5/32"
NEEDLE, DISPOSABLE MISCELLANEOUS
COMMUNITY
Start: 2021-02-05

## 2021-12-27 ENCOUNTER — TELEPHONE (OUTPATIENT)
Dept: OTOLARYNGOLOGY | Facility: CLINIC | Age: 81
End: 2021-12-27
Payer: MEDICARE

## 2023-01-03 ENCOUNTER — PES CALL (OUTPATIENT)
Dept: ADMINISTRATIVE | Facility: OTHER | Age: 83
End: 2023-01-03
Payer: MEDICARE

## 2023-09-29 NOTE — TELEPHONE ENCOUNTER
----- Message from Alicia Nicholson sent at 9/25/2017  8:26 AM CDT -----  Contact: sister, Sherie Solomon  Patient would like to reschedule appointment to Thursday of next week.  Please call patient's sister, Sherie Solomon at [phone]. Thanks!   Error    No results found from the In Basket message.    3:42 PM      Results {WERE / WERE NOT:34029} successfully communicated with the {RHEUM PARENT/PATIENT:[id number]} and they {AMB Acknowledged/Did Not Acknowledge:90907} their understanding.  Result Communication    No results found from the In Basket message.    3:43 PM      Results {WERE / WERE NOT:61176} successfully communicated with the {RHEUM PARENT/PATIENT:[id number]} and they {AMB Acknowledged/Did Not Acknowledge:72703} their understanding.  Result Communication    No results found from the In Basket message.    11:07 AM      Results {WERE / WERE NOT:37677} successfully communicated with the